# Patient Record
Sex: MALE | Race: WHITE | Employment: UNEMPLOYED | URBAN - METROPOLITAN AREA
[De-identification: names, ages, dates, MRNs, and addresses within clinical notes are randomized per-mention and may not be internally consistent; named-entity substitution may affect disease eponyms.]

---

## 2020-12-09 ENCOUNTER — HOSPITAL ENCOUNTER (INPATIENT)
Age: 25
LOS: 2 days | Discharge: HOME | DRG: 204 | End: 2020-12-11
Attending: EMERGENCY MEDICINE | Admitting: INTERNAL MEDICINE
Payer: COMMERCIAL

## 2020-12-09 ENCOUNTER — APPOINTMENT (OUTPATIENT)
Dept: GENERAL RADIOLOGY | Age: 25
DRG: 204 | End: 2020-12-09
Payer: COMMERCIAL

## 2020-12-09 PROBLEM — R55 POSTURAL SYNCOPE: Status: ACTIVE | Noted: 2020-12-09

## 2020-12-09 LAB
AMPHETAMINE SCREEN, URINE: ABNORMAL
ANION GAP SERPL CALCULATED.3IONS-SCNC: 10 MMOL/L (ref 3–16)
BARBITURATE SCREEN URINE: ABNORMAL
BASOPHILS ABSOLUTE: 0.1 K/UL (ref 0–0.2)
BASOPHILS RELATIVE PERCENT: 0.8 %
BENZODIAZEPINE SCREEN, URINE: ABNORMAL
BILIRUBIN URINE: NEGATIVE
BLOOD, URINE: NEGATIVE
BUN BLDV-MCNC: 14 MG/DL (ref 7–20)
CALCIUM SERPL-MCNC: 9.8 MG/DL (ref 8.3–10.6)
CANNABINOID SCREEN URINE: POSITIVE
CHLORIDE BLD-SCNC: 100 MMOL/L (ref 99–110)
CLARITY: CLEAR
CO2: 30 MMOL/L (ref 21–32)
COCAINE METABOLITE SCREEN URINE: ABNORMAL
COLOR: YELLOW
CREAT SERPL-MCNC: 0.8 MG/DL (ref 0.9–1.3)
EOSINOPHILS ABSOLUTE: 0.2 K/UL (ref 0–0.6)
EOSINOPHILS RELATIVE PERCENT: 2 %
GFR AFRICAN AMERICAN: >60
GFR NON-AFRICAN AMERICAN: >60
GLUCOSE BLD-MCNC: 95 MG/DL (ref 70–99)
GLUCOSE URINE: NEGATIVE MG/DL
HCT VFR BLD CALC: 45.9 % (ref 40.5–52.5)
HEMOGLOBIN: 15.3 G/DL (ref 13.5–17.5)
KETONES, URINE: ABNORMAL MG/DL
LEUKOCYTE ESTERASE, URINE: NEGATIVE
LYMPHOCYTES ABSOLUTE: 1.2 K/UL (ref 1–5.1)
LYMPHOCYTES RELATIVE PERCENT: 15.8 %
Lab: ABNORMAL
MCH RBC QN AUTO: 30.3 PG (ref 26–34)
MCHC RBC AUTO-ENTMCNC: 33.4 G/DL (ref 31–36)
MCV RBC AUTO: 90.7 FL (ref 80–100)
METHADONE SCREEN, URINE: ABNORMAL
MICROSCOPIC EXAMINATION: ABNORMAL
MONOCYTES ABSOLUTE: 0.5 K/UL (ref 0–1.3)
MONOCYTES RELATIVE PERCENT: 6 %
NEUTROPHILS ABSOLUTE: 5.9 K/UL (ref 1.7–7.7)
NEUTROPHILS RELATIVE PERCENT: 75.4 %
NITRITE, URINE: NEGATIVE
OPIATE SCREEN URINE: ABNORMAL
OXYCODONE URINE: ABNORMAL
PDW BLD-RTO: 12.4 % (ref 12.4–15.4)
PH UA: 6.5
PH UA: 6.5 (ref 5–8)
PHENCYCLIDINE SCREEN URINE: ABNORMAL
PLATELET # BLD: 223 K/UL (ref 135–450)
PMV BLD AUTO: 7.6 FL (ref 5–10.5)
POTASSIUM REFLEX MAGNESIUM: 3.6 MMOL/L (ref 3.5–5.1)
PROPOXYPHENE SCREEN: ABNORMAL
PROTEIN UA: NEGATIVE MG/DL
RBC # BLD: 5.06 M/UL (ref 4.2–5.9)
SODIUM BLD-SCNC: 140 MMOL/L (ref 136–145)
SPECIFIC GRAVITY UA: 1.02 (ref 1–1.03)
URINE TYPE: ABNORMAL
UROBILINOGEN, URINE: 1 E.U./DL
WBC # BLD: 7.9 K/UL (ref 4–11)

## 2020-12-09 PROCEDURE — 85025 COMPLETE CBC W/AUTO DIFF WBC: CPT

## 2020-12-09 PROCEDURE — 96361 HYDRATE IV INFUSION ADD-ON: CPT

## 2020-12-09 PROCEDURE — 1200000000 HC SEMI PRIVATE

## 2020-12-09 PROCEDURE — 81003 URINALYSIS AUTO W/O SCOPE: CPT

## 2020-12-09 PROCEDURE — 71046 X-RAY EXAM CHEST 2 VIEWS: CPT

## 2020-12-09 PROCEDURE — 73060 X-RAY EXAM OF HUMERUS: CPT

## 2020-12-09 PROCEDURE — 73030 X-RAY EXAM OF SHOULDER: CPT

## 2020-12-09 PROCEDURE — 36415 COLL VENOUS BLD VENIPUNCTURE: CPT

## 2020-12-09 PROCEDURE — 99283 EMERGENCY DEPT VISIT LOW MDM: CPT

## 2020-12-09 PROCEDURE — 80307 DRUG TEST PRSMV CHEM ANLYZR: CPT

## 2020-12-09 PROCEDURE — 2580000003 HC RX 258: Performed by: STUDENT IN AN ORGANIZED HEALTH CARE EDUCATION/TRAINING PROGRAM

## 2020-12-09 PROCEDURE — 96360 HYDRATION IV INFUSION INIT: CPT

## 2020-12-09 PROCEDURE — 80048 BASIC METABOLIC PNL TOTAL CA: CPT

## 2020-12-09 RX ORDER — BUSPIRONE HYDROCHLORIDE 10 MG/1
10 TABLET ORAL 2 TIMES DAILY
COMMUNITY

## 2020-12-09 RX ORDER — ACETAMINOPHEN 325 MG/1
650 TABLET ORAL EVERY 6 HOURS PRN
Status: DISCONTINUED | OUTPATIENT
Start: 2020-12-09 | End: 2020-12-11 | Stop reason: HOSPADM

## 2020-12-09 RX ORDER — POLYETHYLENE GLYCOL 3350 17 G/17G
17 POWDER, FOR SOLUTION ORAL DAILY PRN
Status: DISCONTINUED | OUTPATIENT
Start: 2020-12-09 | End: 2020-12-11 | Stop reason: HOSPADM

## 2020-12-09 RX ORDER — SODIUM CHLORIDE 0.9 % (FLUSH) 0.9 %
10 SYRINGE (ML) INJECTION PRN
Status: DISCONTINUED | OUTPATIENT
Start: 2020-12-09 | End: 2020-12-11 | Stop reason: HOSPADM

## 2020-12-09 RX ORDER — SODIUM CHLORIDE, SODIUM LACTATE, POTASSIUM CHLORIDE, AND CALCIUM CHLORIDE .6; .31; .03; .02 G/100ML; G/100ML; G/100ML; G/100ML
1000 INJECTION, SOLUTION INTRAVENOUS ONCE
Status: COMPLETED | OUTPATIENT
Start: 2020-12-09 | End: 2020-12-09

## 2020-12-09 RX ORDER — BUSPIRONE HYDROCHLORIDE 10 MG/1
10 TABLET ORAL 2 TIMES DAILY
Status: DISCONTINUED | OUTPATIENT
Start: 2020-12-10 | End: 2020-12-11 | Stop reason: HOSPADM

## 2020-12-09 RX ORDER — TRAMADOL HYDROCHLORIDE 50 MG/1
50 TABLET ORAL EVERY 6 HOURS PRN
Status: DISCONTINUED | OUTPATIENT
Start: 2020-12-09 | End: 2020-12-11 | Stop reason: HOSPADM

## 2020-12-09 RX ORDER — SODIUM CHLORIDE 0.9 % (FLUSH) 0.9 %
10 SYRINGE (ML) INJECTION EVERY 12 HOURS SCHEDULED
Status: DISCONTINUED | OUTPATIENT
Start: 2020-12-10 | End: 2020-12-11 | Stop reason: HOSPADM

## 2020-12-09 RX ORDER — ONDANSETRON 2 MG/ML
4 INJECTION INTRAMUSCULAR; INTRAVENOUS EVERY 6 HOURS PRN
Status: DISCONTINUED | OUTPATIENT
Start: 2020-12-09 | End: 2020-12-11 | Stop reason: HOSPADM

## 2020-12-09 RX ORDER — PROMETHAZINE HYDROCHLORIDE 12.5 MG/1
12.5 TABLET ORAL EVERY 6 HOURS PRN
Status: DISCONTINUED | OUTPATIENT
Start: 2020-12-09 | End: 2020-12-11 | Stop reason: HOSPADM

## 2020-12-09 RX ORDER — LORATADINE 10 MG/1
10 TABLET ORAL DAILY
COMMUNITY

## 2020-12-09 RX ORDER — ACETAMINOPHEN 650 MG/1
650 SUPPOSITORY RECTAL EVERY 6 HOURS PRN
Status: DISCONTINUED | OUTPATIENT
Start: 2020-12-09 | End: 2020-12-11 | Stop reason: HOSPADM

## 2020-12-09 RX ADMIN — SODIUM CHLORIDE, POTASSIUM CHLORIDE, SODIUM LACTATE AND CALCIUM CHLORIDE 1000 ML: 600; 310; 30; 20 INJECTION, SOLUTION INTRAVENOUS at 20:16

## 2020-12-09 ASSESSMENT — ENCOUNTER SYMPTOMS
COUGH: 0
DIARRHEA: 0
NAUSEA: 0
VOMITING: 0
ABDOMINAL PAIN: 0
SHORTNESS OF BREATH: 0
VOICE CHANGE: 0
ROS SKIN COMMENTS: POSITIVE FOR BRUISING

## 2020-12-10 LAB
ANION GAP SERPL CALCULATED.3IONS-SCNC: 9 MMOL/L (ref 3–16)
BASOPHILS ABSOLUTE: 0.1 K/UL (ref 0–0.2)
BASOPHILS RELATIVE PERCENT: 1 %
BUN BLDV-MCNC: 12 MG/DL (ref 7–20)
CALCIUM SERPL-MCNC: 9.2 MG/DL (ref 8.3–10.6)
CHLORIDE BLD-SCNC: 104 MMOL/L (ref 99–110)
CO2: 27 MMOL/L (ref 21–32)
CREAT SERPL-MCNC: 0.8 MG/DL (ref 0.9–1.3)
EOSINOPHILS ABSOLUTE: 0.3 K/UL (ref 0–0.6)
EOSINOPHILS RELATIVE PERCENT: 4.2 %
GFR AFRICAN AMERICAN: >60
GFR NON-AFRICAN AMERICAN: >60
GLUCOSE BLD-MCNC: 99 MG/DL (ref 70–99)
HCT VFR BLD CALC: 41.2 % (ref 40.5–52.5)
HEMOGLOBIN: 13.9 G/DL (ref 13.5–17.5)
LV EF: 58 %
LVEF MODALITY: NORMAL
LYMPHOCYTES ABSOLUTE: 2.1 K/UL (ref 1–5.1)
LYMPHOCYTES RELATIVE PERCENT: 34.7 %
MCH RBC QN AUTO: 30.2 PG (ref 26–34)
MCHC RBC AUTO-ENTMCNC: 33.7 G/DL (ref 31–36)
MCV RBC AUTO: 89.6 FL (ref 80–100)
MONOCYTES ABSOLUTE: 0.5 K/UL (ref 0–1.3)
MONOCYTES RELATIVE PERCENT: 7.4 %
NEUTROPHILS ABSOLUTE: 3.2 K/UL (ref 1.7–7.7)
NEUTROPHILS RELATIVE PERCENT: 52.7 %
PDW BLD-RTO: 12.1 % (ref 12.4–15.4)
PLATELET # BLD: 189 K/UL (ref 135–450)
PMV BLD AUTO: 7.6 FL (ref 5–10.5)
POTASSIUM REFLEX MAGNESIUM: 3.9 MMOL/L (ref 3.5–5.1)
RBC # BLD: 4.6 M/UL (ref 4.2–5.9)
SARS-COV-2, NAAT: NOT DETECTED
SODIUM BLD-SCNC: 140 MMOL/L (ref 136–145)
TOTAL CK: 2167 U/L (ref 39–308)
WBC # BLD: 6.2 K/UL (ref 4–11)

## 2020-12-10 PROCEDURE — 82550 ASSAY OF CK (CPK): CPT

## 2020-12-10 PROCEDURE — 85025 COMPLETE CBC W/AUTO DIFF WBC: CPT

## 2020-12-10 PROCEDURE — 6370000000 HC RX 637 (ALT 250 FOR IP)

## 2020-12-10 PROCEDURE — 95819 EEG AWAKE AND ASLEEP: CPT

## 2020-12-10 PROCEDURE — 1200000000 HC SEMI PRIVATE

## 2020-12-10 PROCEDURE — 80048 BASIC METABOLIC PNL TOTAL CA: CPT

## 2020-12-10 PROCEDURE — 2580000003 HC RX 258

## 2020-12-10 PROCEDURE — 93306 TTE W/DOPPLER COMPLETE: CPT

## 2020-12-10 PROCEDURE — G0378 HOSPITAL OBSERVATION PER HR: HCPCS

## 2020-12-10 PROCEDURE — 96372 THER/PROPH/DIAG INJ SC/IM: CPT

## 2020-12-10 PROCEDURE — U0002 COVID-19 LAB TEST NON-CDC: HCPCS

## 2020-12-10 PROCEDURE — 36415 COLL VENOUS BLD VENIPUNCTURE: CPT

## 2020-12-10 PROCEDURE — 6360000002 HC RX W HCPCS

## 2020-12-10 RX ADMIN — ENOXAPARIN SODIUM 40 MG: 40 INJECTION SUBCUTANEOUS at 08:27

## 2020-12-10 RX ADMIN — Medication 10 ML: at 08:28

## 2020-12-10 RX ADMIN — TRAMADOL HYDROCHLORIDE 50 MG: 50 TABLET, FILM COATED ORAL at 15:17

## 2020-12-10 RX ADMIN — BUSPIRONE HYDROCHLORIDE 10 MG: 10 TABLET ORAL at 20:46

## 2020-12-10 RX ADMIN — BUSPIRONE HYDROCHLORIDE 10 MG: 10 TABLET ORAL at 08:27

## 2020-12-10 RX ADMIN — SERTRALINE HYDROCHLORIDE 50 MG: 50 TABLET ORAL at 08:27

## 2020-12-10 RX ADMIN — TRAMADOL HYDROCHLORIDE 50 MG: 50 TABLET, FILM COATED ORAL at 00:08

## 2020-12-10 RX ADMIN — TRAMADOL HYDROCHLORIDE 50 MG: 50 TABLET, FILM COATED ORAL at 06:32

## 2020-12-10 ASSESSMENT — PAIN DESCRIPTION - LOCATION
LOCATION: ARM

## 2020-12-10 ASSESSMENT — PAIN DESCRIPTION - ONSET
ONSET: ON-GOING

## 2020-12-10 ASSESSMENT — PAIN DESCRIPTION - DESCRIPTORS
DESCRIPTORS: ACHING

## 2020-12-10 ASSESSMENT — PAIN DESCRIPTION - FREQUENCY
FREQUENCY: CONTINUOUS

## 2020-12-10 ASSESSMENT — PAIN DESCRIPTION - ORIENTATION
ORIENTATION: RIGHT

## 2020-12-10 ASSESSMENT — PAIN DESCRIPTION - PAIN TYPE
TYPE: ACUTE PAIN

## 2020-12-10 ASSESSMENT — PAIN DESCRIPTION - PROGRESSION
CLINICAL_PROGRESSION: GRADUALLY WORSENING
CLINICAL_PROGRESSION: NOT CHANGED
CLINICAL_PROGRESSION: NOT CHANGED

## 2020-12-10 ASSESSMENT — ENCOUNTER SYMPTOMS
SORE THROAT: 0
CHEST TIGHTNESS: 0
SHORTNESS OF BREATH: 0
DIARRHEA: 0
NAUSEA: 0
COUGH: 0
VOMITING: 0
ABDOMINAL PAIN: 0
APNEA: 0
CONSTIPATION: 0

## 2020-12-10 ASSESSMENT — PAIN SCALES - GENERAL
PAINLEVEL_OUTOF10: 6
PAINLEVEL_OUTOF10: 0
PAINLEVEL_OUTOF10: 6
PAINLEVEL_OUTOF10: 0
PAINLEVEL_OUTOF10: 0
PAINLEVEL_OUTOF10: 7

## 2020-12-10 NOTE — PROGRESS NOTES
4 Eyes Admission Assessment     I agree as the admission nurse that 2 RN's have performed a thorough Head to Toe Skin Assessment on the patient. ALL assessment sites listed below have been assessed on admission. Areas assessed by both nurses:   [x]   Head, Face, and Ears   [x]   Shoulders, Back, and Chest  [x]   Arms, Elbows, and Hands   [x]   Coccyx, Sacrum, and Ischium  [x]   Legs, Feet, and Heels        Does the Patient have Skin Breakdown?   No         Dennis Prevention initiated:  No   Wound Care Orders initiated:  No      [facility] nurse consulted for Pressure Injury (Stage 3,4, Unstageable, DTI, NWPT, and Complex wounds) or Dennis score 18 or lower:  No      Nurse 1 eSignature: Electronically signed by Sheron Calderon RN on 12/10/20 at 2:32 AM EST    **SHARE this note so that the co-signing nurse is able to place an eSignature**    Nurse 2 eSignature: Electronically signed by Yovana Chambers RN on 12/10/20 at 3:31 AM EST

## 2020-12-10 NOTE — PROGRESS NOTES
List of Home Medications the patient is currently taking is complete. Home Medication list in EPIC updated to reflect changes noted below. Source of medications in list is the patient. The following medications were added to admission medication list:  Buspirone 10mg BID  Sertraline 50mg QD  Loratadine 10mg QD    Please note: The patient took all of his medications this morning. He does not know the strengths of his medications, but they were added based upon refill history. He said that he would update the nurse on the strength when his family members contact him. Please call with questions.   [phone] [id number]  12/9/2020 9:52 PM

## 2020-12-10 NOTE — ED NOTES
PT presents to the ED from home ambulatory with c/o right upper pain. Pt reports he had a syncopal episode on this past Sunday and has had right arm pain and bruising since. Pt a/o x4, answering questions appropriately.       Trina Ward RN  12/09/20 [id number]

## 2020-12-10 NOTE — PROCEDURES
Name: Gloria Watkins   : 1995   Interpreting Physician: Jose Guadalupe Porras MD   Referring Physician: TATI Goldberg  Date of EE/10/2020      Clinical History:LOC, CANNABINOID ABUSE    Current Antiepileptic Medications: Current Facility-Administered Medications: perflutren lipid microspheres (DEFINITY) injection 1.65 mg, 1.5 mL, Intravenous, ONCE PRN  busPIRone (BUSPAR) tablet 10 mg, 10 mg, Oral, BID  sertraline (ZOLOFT) tablet 50 mg, 50 mg, Oral, Daily  sodium chloride flush 0.9 % injection 10 mL, 10 mL, Intravenous, 2 times per day  sodium chloride flush 0.9 % injection 10 mL, 10 mL, Intravenous, PRN  acetaminophen (TYLENOL) tablet 650 mg, 650 mg, Oral, Q6H PRN **OR** acetaminophen (TYLENOL) suppository 650 mg, 650 mg, Rectal, Q6H PRN  polyethylene glycol (GLYCOLAX) packet 17 g, 17 g, Oral, Daily PRN  promethazine (PHENERGAN) tablet 12.5 mg, 12.5 mg, Oral, Q6H PRN **OR** ondansetron (ZOFRAN) injection 4 mg, 4 mg, Intravenous, Q6H PRN  enoxaparin (LOVENOX) injection 40 mg, 40 mg, Subcutaneous, Daily  traMADol (ULTRAM) tablet 50 mg, 50 mg, Oral, Q6H PRN         Technical Summary:  The EEG was recorded in a digital format on a patient who is reported to be awake and drowsy state during the recording. The patient was not sleep deprived prior to the EEG. The recording revealed  background rhythm approximately 7-8hz range  maximal over the posterior head regions and reactive to eye opening and closure. The record was remarkable for the presence of mild generalized slowing in the form of polymorphic theta activity. The record was remarkable for the absence of epileptiform discharges. Photic stimulation was performed at various flash frequencies and without photoparoxysmal response    Hyperventilation was not performed due to medical condition. During the recording stage II sleep  was not seen. The EKG lead revealed no rhythm abnormalties.     EEG Interpretation:   The EEG was abnormal

## 2020-12-10 NOTE — CARE COORDINATION
Case Management Assessment           Initial Evaluation                Date / Time of Evaluation: 12/10/2020 3:29 PM                 Assessment Completed by: Cathie Lancaster    Patient Name: Leah Bar     YOB: 1995  Diagnosis: Postural syncope [R55]     Date / Time: 12/9/2020  7:20 PM    Patient Admission Status: Inpatient    If patient is discharged prior to next notation, then this note serves as note for discharge by case management. Current PCP: No primary care provider on file. Clinic Patient: No    Chart Reviewed: Yes  Patient/ Family Interviewed: Yes    Initial assessment completed at bedside with: patient    Hospitalization in the last 30 days: No    Emergency Contacts:  Extended Emergency Contact Information  Primary Emergency Contact: erasmo sherwood Phone: [phone]  Relation: Parent    Advance Directives:   Code Status: Full 2021 Shoshana Astudillo Giovanniy: No  Agent:   Contact Number:     Copy present: No     In paper Chart: No    Scanned into EMR No    Financial  [facility]  [address] / Plan: [facility]  [address] / Product Type: *No Product type* /     Pre-cert required for SNF: No    Pharmacy  No Pharmacies Listed    Potential assistance Purchasing Medications: Potential Assistance Purchasing Medications: No  Does Patient want to participate in local refill/ meds to beds program?:      Meds To Beds General Rules:  1. Can ONLY be done Monday- Friday between 8:30am-5pm  2. Prescription(s) must be in pharmacy by 3pm to be filled same day  3. Copy of patient's insurance/ prescription drug card and patient face sheet must be sent along with the prescription(s)  4. Cost of Rx cannot be added to hospital bill. If financial assistance is needed, please contact unit  or ;  or  CANNOT provide pharmacy voucher for patients co-pays  5.  Patients can then  the prescription on their way out of the hospital at discharge, or pharmacy can deliver to the bedside if staff is available. (payment due at time of pick-up or delivery - cash, check, or card accepted)     Able to afford home medications/ co-pay costs: Yes    ADLS  Support Systems: Family Members    PT AM-PAC:   /24  OT AM-PAC:   /24    [place]: home w/family  Steps:     Plans to RETURN to current housing: Yes  Barriers to RETURNING to current housing: safety, pain    Yobani Burrell 77  Currently ACTIVE with [facility] [address]: No  Home Care Agency: Not Applicable    Currently ACTIVE with [place] on Aging: No  Passport/ Waiver: No  Passport/ Waiver Services: Not Applicable    :  Phone:       [address] [employer] Provider:   Equipment:     Home Oxygen and [address] prior to admission: No  [address]: Not Applicable  Other Respiratory Equipment:     Informed of need to bring portable home O2 tank on day of DISCHARGE for nursing to connect prior to leaving: No  Verbalized agreement/Understanding: No  Person to bring portable tank at discharge:     Dialysis  Active with HD/PD prior to admission: No  Nephrologist:     HD Center:  Not Applicable    DISCHARGE PLAN:  Disposition: Home- No Services Needed    Transportation PLAN for discharge: family     Factors facilitating achievement of predicted outcomes: Family support    Barriers to discharge: Pain    Additional Case Management Notes:   Patient here from home with family. Plans to discharge to home with no needs. Will follow up outpatient with Ortho and physical therapy in 2 weeks.       The Plan for Transition of Care is related to the following treatment goals of Postural syncope [R55]    The Patient and/or patient representative Natalie Fisher and his family were provided with a choice of provider and agrees with the discharge plan Yes    Freedom of choice list was provided with basic dialogue that supports the patient's individualized plan of care/goals and shares the quality data associated with the providers.  Yes    Care Transition patient: No    Brissa Bryant RN  [facility]  Case Management Department  Ph: [phone]   Fax: [phone]

## 2020-12-10 NOTE — CONSULTS
Neurology Consult Note  Reason for Consult: syncope, possible seizure    Chief complaint: \"I broke my right shoulder\"    Dr Samantha Ovalles MD asked me to see Kofi Payton in consultation for evaluation of syncope, possible seizure. History of Present Illness:  I obtained my information via the patient, supplemented with chart review. Kofi Payton is a 31 y.o. male with hx of anxiety, depression,  pectus excavatum who presented to the ED on 12/9/20 for evaluation of right shoulder pain s/p fall found to have fracture of proximal humerus. The patient had fallen at home 3 days prior to admission. He had reportedly been out drinking heavily the night prior to his fall. The following day he was feeling generally poor, not eating or drinking much. He had stood up from his chair which is the last thing he recalls. The next thing he remembers is waking up on the couch to his best guess 1.5 hrs later. He assumes he had fallen else where and was able to get his way to the couch as items were tipped over in his house and he had right shoulder pain and bruising but he does not have recollection of the event. He does also endorse biting the right side of his tongue, and general arthralgias specifically right shoulder. Otherwise denies any urinary or bowel incontinence, headache, recent illness, fever, chills, shortness of breath or chest pain. He has had events \" of head rushing\" described as presyncope with brief loss of vision and difficulty walking with position changes in the past but has not had an event like that for the last 8-9 years. He denies any recent medications changes. He has daily EtOH consumption and reports 1-2 standard drinks of bourbon or craft beer with increased consumption on the weekends. Denies ever withdrawing from EtOH and reports his last drink was Saturday 12/5/20 He does use marijuana. Denies any tobacco use.      Today the patient reports that other than his general °C) 98.1 °F (36.7 °C) 97.8 °F (36.6 °C) 98.2 °F (36.8 °C)   TempSrc: Oral Oral Oral Oral   SpO2: 100% 96% 96% 97%   Weight: 185 lb (83.9 kg)      Height: 5' 6\" (1.676 m)         Constitutional    Vital signs: BP, HR, and RR reviewed   General Alert, no distress, well-nourished  Eyes: unable to visualize the fundi  Cardiovascular: pulses symmetric in all 4 extremities. No peripheral edema. Psychiatric: cooperative with examination, no  psychotic behavior noted. Neurologic  Mental status:   orientation to person, place, time and situation   General fund of knowledge:  grossly intact   Memory: Short term and long term intact   Attention intact as able to attend well to the exam     Language fluent in conversation   Comprehension intact; follows simple commands  Cranial nerves:   CN2: Visual Fields full w/o extinction on confrontational testing,   CN 3,4,6: extraocular muscles intact, PERRLA @ 4mm. CN5: V1: V2: V3: intact to light touch sensation bilaterally  CN7: upper and lower facial symmetric without dysarthria  CN8: hearing grossly intact to conversation. CN9/10: palate elevated symmetrically  CN11: right: deferred for comfort r/t right humerus fracture. Left: full strength , SCM without weakness. CN12: tongue midline with protrusion. Able to move tongue side to side. Strength: Grasp: BUE 5/5 . RUE: Limited assessment r/t right humerus fracture. 3/5  elbow flexion, elbow extension. LUE: 5/5  Shoulder abduction, elbow flexion, elbow extension. Dorsiflexion: R 5/5, L 5/5 ;  Plantar flexion: R 5/5, L 5/5  RLE: 5/5 Hip flexion, Knee flexion, Knee extension. LLE: 5/5 Hip flexion, Knee flexion, Knee extension. Deep tendon reflexes:   R Bicep: deferred for comfort s/p fracture   R Brachioradialis: deferred for comfort s/p fracture  R Patellar: 3+   R Babinski: Toes no response  L Bicep 2+ L Brachioradialis[de-identified] 2+  L Patellar: 3+    L Babinski: Toes no response.    Sensory: light touch intact in all 4 extremities. No sensory extinction on double simultaneous stimulation  Cerebellar/coordination: finger nose finger normal without ataxia in LUE. RUE deferred for comfort. Tone: normal in all LUE and BLE extremities. RUE deferred for comfort. Gait: deferred for safety. Labs  Na: 140  K: 3.9  BUN: 12  Creatinine: 0.8  Glucose: 99  Calcium: 9.2    WBC: 6.2  RBC: 4.60  Hgb: 13.9  Hct: 41.2  Platelet: 065    Urine Drug Screen: Positive for cannabinoid  UA: negative for nitrites and leukocyte esterase. Studies  No pertinent neuro imaging on file for admission. Impression  1. Loss of consciousness with prolonged amnesia and tongue trauma. 2. EtOH abuse. 3. Right Humerus fracture. 4. Drug abuse     Karla Melchor is a 31 y.o. male who presents for evaluation of right shoulder pain s/p suspect fall with loss of consciousness with prolonged amnesia and tongue trauma, 3 days ago. Found to have proximal right humerus fracture. Patient reported heavy EtOH consumption night prior to event. He does have tongue trauma. He does have hx of prior presyncope event. Etiology: cannot exclude seizure given prolonged amnesia to event, and tongue trauma. EtOH can lower seizure thresh hold. Cannot exclude other causes of syncope. Recommendations  - Routine EEG (ordered) No AED at this time.   - MRI Brain w/ w/o (ordere)   - Orthostatic Vitals. - Infectious/Metabolic work up per primary team.   - CIWA monitoring and management primary team.   - Seizure precautions. - Given the patient had an altered level of awareness, the patient should not drive for at least 3 months, episode free with clearance from a physician, as well as refraining from activities that could be of danger to himself or others should her have another seizure such as swimming, bathing, operating heavy machinery, climbing heights or watching young children who do not have the ability to call for help if needed.   He verbalized understanding.        Kana Mcdonald, [address] Neurology    A copy of this note was provided for Dr Aubrey Hansen MD

## 2020-12-10 NOTE — PROGRESS NOTES
Progress Note    Admit Date: 12/9/2020  Diet: DIET GENERAL;    CC: fall & right shoulder pain    Interval history: Patient seen at bedside this morning. He reports feeling well with some soarness of right shoulder. Patient reports that he does not remember the fall and only recalls falling off a couch. Patient reports pain is controlled well. Patient denies CP, fever, dysuria , headache , fevers. No acute events overnight. HPI: Mr. Jessy Zavala is a 24-year-old male with past medical history of anxiety, pectus excavatum who presents today for worsening right shoulder pain. Patient states that 3 days prior to presentation he had a fall at home. He states that he stood up from a chair, became dizzy, lost consciousness and fell. He does not know how long he was unconscious for. Patient woke up while laying on the couch. Does not recall how he would have gotten from the floor to the couch. Of note, patient went out with friends the evening before. He drank more than usual.  The morning in question the patient did not have breakfast and only had some coffee and a little bit of water. If any question happened around 11 or 12 AM.  The patient does endorse occasional marijuana use, but denies other drugs. He has not started or stopped any new medications. Patient does not have a history of seizure, and did not have bowel or urinary incontinence. States he might have passed out but is unsure. Patient does endorse having a unknown heart murmur as his child, but has not had any issues in adult life. Patient does endorse a similar episode greater than 5 years ago where he had presyncope, but did not fall he did not present for medical treatment at this time. He also has a history of pectus excavatum over the past several days, the bruising became worse over his right upper arm and the pain continued. This prompted him to present to the ED today to have his arm evaluated.     In the ED.   Vitals were stable

## 2020-12-10 NOTE — PLAN OF CARE
Problem: Falls - Risk of:  Goal: Will remain free from falls  Description: Will remain free from falls  Outcome: Ongoing   Pt has been free from falls this shift, bed alarm on, bed in lowest position, 2/4 side rails up, nonskid socks on, wheels locked, bedside table and call light in reach. Encouraged pt to call out if needed anything. Problem: Pain:  Goal: Pain level will decrease  Description: Pain level will decrease  Outcome: Ongoing   Pt c/o moderate aching pain in right arm, medicated per mar with prn tramadol. Pt verbalized relief and is now resting in bed. Will continue to assess pain level.

## 2020-12-10 NOTE — CONSULTS
Films reviewed and patient discussed with ER physican last night. They were going to send him home, but then admitted him due to suspected syncope. Patient with non-displaced right proximal humerus fracture. Neurovascular status intact per ER attending. A/P:  Fracture can likely be managed non-operatively. Recommend sling and swathe. We will start some physical therapy in about 2 weeks for ROM. COVID test pending. Follw with you here in hospital.    If he is going to be discharged, he should follow up with me next week in my office for repeat films. Eling and swathe should not be removed except to adust for comfort.

## 2020-12-10 NOTE — PROGRESS NOTES
Pt alert and oriented x4, VSS, IV ns locked in left arm. Pt denies headache and numbness/tingling. Right arm in sling, pt had moderate hand . Fingers are warm to the touch. Bed alarm on, bedside table and call light in reach.

## 2020-12-10 NOTE — ED PROVIDER NOTES
[facility] RESIDENT NOTE       Date of evaluation: 12/9/2020    Chief Complaint     Loss of Consciousness (syncope on 12/6) and Arm Pain (right arm pain and bruising from syncope)      of Present Illness     Darnell Chilel is a 31 y.o. male who presents with loss of consciousness and right shoulder pain. 3 days ago, the patient was at home when he stood up from a chair and lost consciousness. He does not know how long he was unconscious, but he woke up several hours later lying on a couch. He does not recall how he got to the couch. He has no known history of seizures. He does report that he may have bitten his tongue during this episode. He began having pain in his right arm once he woke up. Over the past several days, he has developed bruising over his right upper arm. He has no numbness. He does have pain with any range of motion of the right arm, with the pain located in his right shoulder. He has no pain in his right elbow or right wrist.  He has no chest pain or difficulty breathing. He does have a history of pectus excavatum with no other known associated diagnoses. The day before his loss of consciousness, he was drinking alcohol, but not on the day of the event. He presented to urgent care for the symptoms and was advised to present to the emergency department for further evaluation. Review of Systems     Review of Systems   Constitutional: Negative for chills and fever. HENT: Negative for voice change. Eyes: Negative for visual disturbance. Respiratory: Negative for cough and shortness of breath. Cardiovascular: Negative for chest pain. Gastrointestinal: Negative for abdominal pain, diarrhea, nausea and vomiting. Musculoskeletal: Negative for neck pain and neck stiffness. Skin:        Positive for bruising   Neurological: Positive for syncope. Negative for numbness.    All other systems reviewed and are negative. Pertinent positive and negative findings as documented in the HPI. Otherwise all other systems were reviewed and were negative. Past Medical, Surgical, Family, and Social History     He has no past medical history on file. He has no past surgical history on file. His family history is not on file. He     Medications     Previous Medications    No medications on file       Allergies     He has no allergies on file. Physical Exam     INITIAL VITALS: BP: 130/89, Temp: 98.9 °F (37.2 °C), Pulse: 117, Resp: 13, SpO2: 100 %   Physical Exam  Vitals signs and nursing note reviewed. Constitutional:       General: He is not in acute distress. HENT:      Head: Normocephalic and atraumatic. Eyes:      General: No scleral icterus. Extraocular Movements: Extraocular movements intact. Pupils: Pupils are equal, round, and reactive to light. Neck:      Musculoskeletal: Normal range of motion and neck supple. Cardiovascular:      Rate and Rhythm: Regular rhythm. Tachycardia present. Heart sounds: No murmur. No friction rub. No gallop. Pulmonary:      Effort: No respiratory distress. Breath sounds: Normal breath sounds. No wheezing, rhonchi or rales. Abdominal:      General: Abdomen is flat. Palpations: Abdomen is soft. Tenderness: There is no abdominal tenderness. There is no guarding or rebound. Musculoskeletal:      Comments: Mild tenderness to the right posterior shoulder. Mild swelling to the right shoulder and right upper arm with ecchymosis overlying the right upper arm anteriorly. Right radial pulse intact. No pain with range of motion of the right elbow or wrist.  Mild pain with passive range of motion of the right elbow. His active range of motion is limited significantly by pain. No tenderness to the long bones of the arms. Skin:     General: Skin is warm. Capillary Refill: Capillary refill takes less than 2 seconds.    Neurological: General: No focal deficit present. Mental Status: He is alert and oriented to person, place, and time. Cranial Nerves: No cranial nerve deficit. Psychiatric:         Mood and Affect: Mood normal.         Behavior: Behavior normal.         DiagnosticResults     EKG   Interpreted in conjunction with emergencydepartment physician Denae Holden MD  Rhythm: sinus tachycardia  Rate: 100-110  Axis: normal  Ectopy: none  Conduction: normal  ST Segments: normal  T Waves:non specific changes  Q Waves: v1 and aVr  Clinical Impression: sinus tachycardia  Comparison: No previous EKG available for comparison    RADIOLOGY:  XR SHOULDER RIGHT (MIN 2 VIEWS)   Final Result      Nondisplaced, comminuted fracture of the right proximal humerus. XR HUMERUS RIGHT (MIN 2 VIEWS)   Final Result      Comminuted, minimally displaced fracture of the proximal humerus. The mid to distal humerus is intact. XR CHEST (2 VW)   Final Result      No acute cardiopulmonary findings.              LABS:   Results for orders placed or performed during the hospital encounter of 12/09/20   CBC Auto Differential   Result Value Ref Range    WBC 7.9 4.0 - 11.0 K/uL    RBC 5.06 4.20 - 5.90 M/uL    Hemoglobin 15.3 13.5 - 17.5 g/dL    Hematocrit 45.9 40.5 - 52.5 %    MCV 90.7 80.0 - 100.0 fL    MCH 30.3 26.0 - 34.0 pg    MCHC 33.4 31.0 - 36.0 g/dL    RDW 12.4 12.4 - 15.4 %    Platelets 819 088 - 351 K/uL    MPV 7.6 5.0 - 10.5 fL    Neutrophils % 75.4 %    Lymphocytes % 15.8 %    Monocytes % 6.0 %    Eosinophils % 2.0 %    Basophils % 0.8 %    Neutrophils Absolute 5.9 1.7 - 7.7 K/uL    Lymphocytes Absolute 1.2 1.0 - 5.1 K/uL    Monocytes Absolute 0.5 0.0 - 1.3 K/uL    Eosinophils Absolute 0.2 0.0 - 0.6 K/uL    Basophils Absolute 0.1 0.0 - 0.2 K/uL   Basic Metabolic Panel w/ Reflex to MG   Result Value Ref Range    Sodium 140 136 - 145 mmol/L    Potassium reflex Magnesium 3.6 3.5 - 5.1 mmol/L    Chloride 100 99 - 110 mmol/L    CO2 30 21 - 32 mmol/L    Anion Gap 10 3 - 16    Glucose 95 70 - 99 mg/dL    BUN 14 7 - 20 mg/dL    CREATININE 0.8 (L) 0.9 - 1.3 mg/dL    GFR Non-African American >60 >60    GFR African American >60 >60    Calcium 9.8 8.3 - 10.6 mg/dL       ED BEDSIDE ULTRASOUND:  None    RECENT VITALS:  BP: 130/89, Temp: 98.9 °F (37.2 °C), Pulse: 117,Resp: 13, SpO2: 100 %     Procedures     None    ED Course     Nursing Notes, Past Medical Hx, Past Surgical Hx, Social Hx, Allergies, and Family Hx were reviewed. The patient was given the followingmedications:  Orders Placed This Encounter   Medications    lactated ringers bolus       CONSULTS:  [address] [place] [address] / MIGUEL A / Shant Grade is a 31 y.o. male presenting with right arm pain after an episode of loss of consciousness 3 days ago. On exam, his right arm is neurovascularly intact and does not appear dislocated. X-ray was obtained of the chest and right shoulder which demonstrates a nondisplaced right proximal humerus fracture. The patient's story does raise concern for possible seizure as a cause for his loss of consciousness and at least moderate energy injury to his right upper arm. I have a lower concern for a syncopal episode as the cause. Lab work today includes renal panel with no concerning electrolyte abnormalities. CBC reveals no leukocytosis or anemia. The patient will be placed in a sling for his proximal humerus fracture. Spoke with neurology who states that he would be unable to follow-up at [facility] where his insurance is accepted for several months. Therefore he recommended admission to the hospital for further evaluation for his possible first episode of seizure. I do not feel that cross-sectional CT imaging is necessary at this time in the emergency department as he will likely get MRI during his admission. The patient was agreeable with this plan.     This patient was also evaluated by the attending physician. All care plans werediscussed and agreed upon. Clinical Impression     1. Loss of consciousness ([facility] [address]    2. Other closed nondisplaced fracture of proximal end of right humerus, initial encounter        Disposition     PATIENT REFERRED TO:  No follow-up provider specified.     DISCHARGE MEDICATIONS:  New Prescriptions    No medications on file       Kareen Morales MD  Resident  12/09/20 4705

## 2020-12-10 NOTE — PLAN OF CARE
Problem: Pain:  Goal: Pain level will decrease  Description: Pain level will decrease  Outcome: Ongoing  Note: Patient complains of pain to right arm. PRN pain medication administered per request and doctors orders. Pt able to fall asleep. Will continue to monitor. Problem: Falls - Risk of:  Goal: Will remain free from falls  Description: Will remain free from falls  Outcome: Ongoing  Note: Patient medium fall risk and is up with assist x1 SBA. Patient alert and oriented x4, non skid socks on, bed in lowest position and locked, side rails up x2, call light and belongings within reach, bed alarm on for safety, fall sign posted. Will continue to monitor.

## 2020-12-10 NOTE — PROGRESS NOTES
PGY-1 Resident Progress Note  [facility]    Admit Date: 12/9/2020       CC: R. Shoulder pain    Overnight Events:  No acute events overnight. Patient was not seen,  gone for echo this AM      Family Hx: No family history on file. Scheduled Medications:    busPIRone  10 mg Oral BID    sertraline  50 mg Oral Daily    sodium chloride flush  10 mL Intravenous 2 times per day    enoxaparin  40 mg Subcutaneous Daily      PRN Medications: perflutren lipid microspheres, sodium chloride flush, acetaminophen **OR** acetaminophen, polyethylene glycol, promethazine **OR** ondansetron, traMADol  Diet: DIET GENERAL;    Continuous Infusions:    PHYSICAL EXAM:  /75   Pulse 67   Temp 98.2 °F (36.8 °C) (Oral)   Resp 16   Ht 5' 6\" (1.676 m)   Wt 185 lb (83.9 kg)   SpO2 97%   BMI 29.86 kg/m²   No results for input(s): POCGLU in the last 72 hours. Intake/Output Summary (Last 24 hours) at 12/10/2020 1004  Last data filed at 12/10/2020 0826  Gross per 24 hour   Intake 320 ml   Output 400 ml   Net -80 ml       Physical Exam      LABS:  Recent Labs     12/09/20  2002 12/10/20  0549   WBC 7.9 6.2   HGB 15.3 13.9   HCT 45.9 41.2    189                                                                    Recent Labs     12/09/20  2002 12/10/20  0549    140   K 3.6 3.9    104   CO2 30 27   BUN 14 12   CREATININE 0.8* 0.8*   GLUCOSE 95 99     No results for input(s): AST, ALT, ALB, BILITOT, ALKPHOS in the last 72 hours. No results for input(s): TROPONINI in the last 72 hours. No results for input(s): BNP in the last 72 hours. No results for input(s): CHOL, HDL in the last 72 hours. Invalid input(s): LDLCALCU  No results for input(s): INR in the last 72 hours. Imaging:          XR SHOULDER RIGHT (MIN 2 VIEWS)   Final Result      Nondisplaced, comminuted fracture of the right proximal humerus.       XR HUMERUS RIGHT (MIN 2 VIEWS)   Final Result      Comminuted, minimally displaced fracture of the proximal humerus. The mid to distal humerus is intact. XR CHEST (2 VW)   Final Result      No acute cardiopulmonary findings. MRI BRAIN W WO CONTRAST    (Results Pending)         Assessment & Plan:    Sylvester Powers is a 31 y.o. male that presents for R.  Shoulder pain    # Syncope r/o seizure  - No hx seizures  - Neuro consulted  - Continue telemetry  - f/u echocardiogram     # Fracture of Proximal Humerus  - Orthopedic surgery consulted  - Pain control Ultram 50 mg every 6 hrs    # Anxiety  - Continue home BuSpar and Zoloft      Code Status: Full Code  FEN: IVF: none; DIET GENERAL;  PPX:Lovenox   DISPO: pending neuro and ortho evaluation      This patient will be discussed with attending, Ger Perez MD.    Dari Yun MS4  12/10/2020, 10:04 AM

## 2020-12-10 NOTE — H&P
History & Physical  PGY 2  Internal Medicine    Chief Complaint: Syncopal episode, fall, and right shoulder pain    History Obtained From:  patient    HISTORY OF PRESENT ILLNESS:   Mr. Fausto Ambrosio is a 48-year-old male with past medical history of anxiety, pectus excavatum who presents today for worsening right shoulder pain. Patient states that 3 days prior to presentation he had a fall at home. He states that he stood up from a chair, became dizzy, lost consciousness and fell. He does not know how long he was unconscious for. Patient woke up while laying on the couch. Does not recall how he would have gotten from the floor to the couch. Of note, patient went out with friends the evening before. He drank more than usual.  The morning in question the patient did not have breakfast and only had some coffee and a little bit of water. If any question happened around 11 or 12 AM.  The patient does endorse occasional marijuana use, but denies other drugs. He has not started or stopped any new medications. Patient does not have a history of seizure, and did not have bowel or urinary incontinence. States he might have passed out but is unsure. Patient does endorse having a unknown heart murmur as his child, but has not had any issues in adult life. Patient does endorse a similar episode greater than 5 years ago where he had presyncope, but did not fall he did not present for medical treatment at this time. He also has a history of pectus excavatum over the past several days, the bruising became worse over his right upper arm and the pain continued. This prompted him to present to the ED today to have his arm evaluated. In the ED. Vitals were stable and labs were all within normal limits. X-rays were done of the patient's right shoulder. This showed a minimally displaced fracture of the proximal humerus. Neurology was consulted from the ED and will evaluate patient for syncope.        PAST HISTORY: Pharynx: Oropharynx is clear. Eyes:      General: No scleral icterus. Extraocular Movements: Extraocular movements intact. Conjunctiva/sclera: Conjunctivae normal.      Pupils: Pupils are equal, round, and reactive to light. Neck:      Musculoskeletal: Full passive range of motion without pain, normal range of motion and neck supple. Cardiovascular:      Rate and Rhythm: Normal rate and regular rhythm. Pulses: Normal pulses. Heart sounds: Normal heart sounds, S1 normal and S2 normal. No murmur. Pulmonary:      Effort: Pulmonary effort is normal. No respiratory distress. Breath sounds: Normal breath sounds and air entry. Abdominal:      General: Abdomen is flat. Bowel sounds are normal.      Palpations: Abdomen is soft. Tenderness: There is no abdominal tenderness. Musculoskeletal: Normal range of motion. General: Swelling and tenderness present. Comments: Swelling, tenderness, and bruising of the right proximal upper extremity   Skin:     General: Skin is warm and dry. Neurological:      General: No focal deficit present. Mental Status: He is alert and oriented to person, place, and time. Cranial Nerves: Cranial nerves are intact. Sensory: Sensation is intact. Motor: Motor function is intact. Coordination: Coordination is intact. Gait: Gait is intact. Deep Tendon Reflexes: Reflexes are normal and symmetric. Psychiatric:         Attention and Perception: Attention and perception normal.         Mood and Affect: Mood normal.         Speech: Speech normal.         Behavior: Behavior normal. Behavior is cooperative. Thought Content:  Thought content normal.         Cognition and Memory: Cognition and memory normal.         Judgment: Judgment normal.         Data:   Labs:  CBC:   Recent Labs     12/09/20 2002   WBC 7.9   HGB 15.3   HCT 45.9          BMP:   Recent Labs     12/09/20 2002      K 3.6    CO2 30   BUN 14   CREATININE 0.8*   GLUCOSE 95     LFT's: No results for input(s): AST, ALT, ALB, BILITOT, ALKPHOS in the last 72 hours. Troponin: No results for input(s): TROPONINI in the last 72 hours. BNP:No results for input(s): BNP in the last 72 hours. ABGs: No results for input(s): PHART, BWK1VSI, PO2ART in the last 72 hours. INR: No results for input(s): INR in the last 72 hours. U/A:No results for input(s): NITRITE, COLORU, PHUR, LABCAST, WBCUA, RBCUA, MUCUS, TRICHOMONAS, YEAST, BACTERIA, CLARITYU, SPECGRAV, LEUKOCYTESUR, UROBILINOGEN, BILIRUBINUR, BLOODU, GLUCOSEU, AMORPHOUS in the last 72 hours. Invalid input(s): KETONESU    XR SHOULDER RIGHT (MIN 2 VIEWS)   Final Result      Nondisplaced, comminuted fracture of the right proximal humerus. XR HUMERUS RIGHT (MIN 2 VIEWS)   Final Result      Comminuted, minimally displaced fracture of the proximal humerus. The mid to distal humerus is intact. XR CHEST (2 VW)   Final Result      No acute cardiopulmonary findings. ASSESSMENT AND PLAN:   Mr. Marlyne Cranker is a 18-year-old male with past medical history of anxiety who presents today for worsening right shoulder pain and a syncopal episode. Syncope, rule out seizure  Patient endorsing a syncopal event with fall 3 days prior to presentation. Current the morning after the patient was drinking alcohol. States he did not eat breakfast and only had some coffee with a glass of water. Endorses feeling dehydrated at this time. Patient denies low-level/bladder incontinence. Unsure of whether or not he bit his tongue. Patient does endorse heart murmur as a child and has pectus excavatum. Has not had an echocardiogram done previously. Patient does endorse a similar episode greater than 5 years ago where he had presyncope, but did not fall he did not present for medical treatment at this time.   · Neurology consulted for evaluation  · Continue Telemetry  · F/U Echocardiogram    Fracture of the proximal humerus. Patient has a minimally displaced fracture of the proximal humerus occurring due to fall 2/2 syncopal episode.    · Orthopedic surgery consulted  · Pain control: Ultram 50 mg every 6 hours    Anxiety  · Continue home BuSpar and Zoloft    Code Status: Full  FEN: General  PPX: Lovenox  DISPO: General Medical Floor    This patient has been staffed and discussed with Dr. Lulu Bishop,   -----------------------------  Davida Wilson MD, PGY-2  12/9/2020  10:22 PM

## 2020-12-10 NOTE — ED PROVIDER NOTES
ED Attending Attestation Note     Date of evaluation: 12/9/2020    This patient was seen by the resident. I have seen and examined the patient, agree with the workup, evaluation, management and diagnosis. The care plan has been discussed. I have reviewed the ECG and concur with the resident's interpretation. My assessment reveals 76-year-old male with no known past medical history who presents with right shoulder pain and loss of consciousness. Patient apparently was working at his laptop 3 days ago when he stood up, felt lightheaded and lost consciousness for an unknown amount of time. Awoke on his couch, did not remember how he got there. Noted that the chair was tipped over and his glass of water was spilled. Since that time he has had worsening right arm pain, swelling, and ecchymosis. Went to an urgent care for initial evaluation but was advised to be seen in the ED. Currently other than the right arm pain does not have any specific symptoms and on exam has swelling, ecchymosis, and pain of the right proximal arm and is neurovascularly intact in the extremity. Extremely limited active range of motion secondary to pain with also limited passive range of motion for the same reason. Pupils are mydriatic but equal.  He is intermittently tachycardic from the low 100s to 120s but normotensive, normal respirations on room air. Otherwise neurologic exam and remainder of physical exam is noncontributory. Plain films of the right humerus and shoulder demonstrated a proximal humerus fracture. Splint applied in the ED. Remainder of the work-up in the ED unrevealing for underlying etiology. Differential includes syncope versus new onset seizure. Case was discussed with neurology, patient will be admitted to the hospital for further workup. Note that CT head in the ED was deferred.  Patients episode occurred three days ago with no recurrence, no ongoing neurologic symptoms, and he currently has a

## 2020-12-11 ENCOUNTER — APPOINTMENT (OUTPATIENT)
Dept: MRI IMAGING | Age: 25
DRG: 204 | End: 2020-12-11
Payer: COMMERCIAL

## 2020-12-11 VITALS
WEIGHT: 185 LBS | HEART RATE: 85 BPM | OXYGEN SATURATION: 97 % | BODY MASS INDEX: 29.73 KG/M2 | HEIGHT: 66 IN | DIASTOLIC BLOOD PRESSURE: 69 MMHG | SYSTOLIC BLOOD PRESSURE: 108 MMHG | RESPIRATION RATE: 18 BRPM | TEMPERATURE: 97.6 F

## 2020-12-11 LAB
ANION GAP SERPL CALCULATED.3IONS-SCNC: 9 MMOL/L (ref 3–16)
BASOPHILS ABSOLUTE: 0.1 K/UL (ref 0–0.2)
BASOPHILS RELATIVE PERCENT: 1 %
BUN BLDV-MCNC: 15 MG/DL (ref 7–20)
CALCIUM SERPL-MCNC: 9.7 MG/DL (ref 8.3–10.6)
CHLORIDE BLD-SCNC: 102 MMOL/L (ref 99–110)
CO2: 28 MMOL/L (ref 21–32)
CREAT SERPL-MCNC: 0.9 MG/DL (ref 0.9–1.3)
EOSINOPHILS ABSOLUTE: 0.3 K/UL (ref 0–0.6)
EOSINOPHILS RELATIVE PERCENT: 4.9 %
GFR AFRICAN AMERICAN: >60
GFR NON-AFRICAN AMERICAN: >60
GLUCOSE BLD-MCNC: 97 MG/DL (ref 70–99)
HCT VFR BLD CALC: 44 % (ref 40.5–52.5)
HEMOGLOBIN: 14.9 G/DL (ref 13.5–17.5)
LACTIC ACID: 1 MMOL/L (ref 0.4–2)
LYMPHOCYTES ABSOLUTE: 2 K/UL (ref 1–5.1)
LYMPHOCYTES RELATIVE PERCENT: 28.8 %
MCH RBC QN AUTO: 30.2 PG (ref 26–34)
MCHC RBC AUTO-ENTMCNC: 33.9 G/DL (ref 31–36)
MCV RBC AUTO: 89.2 FL (ref 80–100)
MONOCYTES ABSOLUTE: 0.5 K/UL (ref 0–1.3)
MONOCYTES RELATIVE PERCENT: 7 %
NEUTROPHILS ABSOLUTE: 4 K/UL (ref 1.7–7.7)
NEUTROPHILS RELATIVE PERCENT: 58.3 %
PDW BLD-RTO: 11.7 % (ref 12.4–15.4)
PLATELET # BLD: 211 K/UL (ref 135–450)
PMV BLD AUTO: 7.1 FL (ref 5–10.5)
POTASSIUM REFLEX MAGNESIUM: 4.2 MMOL/L (ref 3.5–5.1)
RBC # BLD: 4.93 M/UL (ref 4.2–5.9)
SODIUM BLD-SCNC: 139 MMOL/L (ref 136–145)
TOTAL CK: 826 U/L (ref 39–308)
WBC # BLD: 6.8 K/UL (ref 4–11)

## 2020-12-11 PROCEDURE — G0378 HOSPITAL OBSERVATION PER HR: HCPCS

## 2020-12-11 PROCEDURE — 6370000000 HC RX 637 (ALT 250 FOR IP)

## 2020-12-11 PROCEDURE — 6360000002 HC RX W HCPCS

## 2020-12-11 PROCEDURE — 6360000004 HC RX CONTRAST MEDICATION: Performed by: NURSE PRACTITIONER

## 2020-12-11 PROCEDURE — A9576 INJ PROHANCE MULTIPACK: HCPCS | Performed by: NURSE PRACTITIONER

## 2020-12-11 PROCEDURE — 36415 COLL VENOUS BLD VENIPUNCTURE: CPT

## 2020-12-11 PROCEDURE — 2580000003 HC RX 258

## 2020-12-11 PROCEDURE — 85025 COMPLETE CBC W/AUTO DIFF WBC: CPT

## 2020-12-11 PROCEDURE — 83605 ASSAY OF LACTIC ACID: CPT

## 2020-12-11 PROCEDURE — 96372 THER/PROPH/DIAG INJ SC/IM: CPT

## 2020-12-11 PROCEDURE — 70553 MRI BRAIN STEM W/O & W/DYE: CPT

## 2020-12-11 PROCEDURE — 82550 ASSAY OF CK (CPK): CPT

## 2020-12-11 PROCEDURE — 80048 BASIC METABOLIC PNL TOTAL CA: CPT

## 2020-12-11 RX ORDER — TRAMADOL HYDROCHLORIDE 50 MG/1
50 TABLET ORAL EVERY 6 HOURS PRN
Qty: 12 TABLET | Refills: 0 | Status: SHIPPED | OUTPATIENT
Start: 2020-12-11 | End: 2020-12-14

## 2020-12-11 RX ADMIN — BUSPIRONE HYDROCHLORIDE 10 MG: 10 TABLET ORAL at 08:48

## 2020-12-11 RX ADMIN — GADOTERIDOL 18 ML: 279.3 INJECTION, SOLUTION INTRAVENOUS at 11:09

## 2020-12-11 RX ADMIN — SERTRALINE HYDROCHLORIDE 50 MG: 50 TABLET ORAL at 08:48

## 2020-12-11 RX ADMIN — Medication 10 ML: at 08:49

## 2020-12-11 RX ADMIN — TRAMADOL HYDROCHLORIDE 50 MG: 50 TABLET, FILM COATED ORAL at 03:19

## 2020-12-11 RX ADMIN — TRAMADOL HYDROCHLORIDE 50 MG: 50 TABLET, FILM COATED ORAL at 14:27

## 2020-12-11 RX ADMIN — ENOXAPARIN SODIUM 40 MG: 40 INJECTION SUBCUTANEOUS at 08:48

## 2020-12-11 ASSESSMENT — PAIN SCALES - GENERAL
PAINLEVEL_OUTOF10: 6
PAINLEVEL_OUTOF10: 7

## 2020-12-11 NOTE — PLAN OF CARE
Problem: Falls - Risk of:  Goal: Will remain free from falls  Description: Will remain free from falls  12/11/2020 1236 by Ana Bro RN  Outcome: Ongoing     Problem: Pain:  Goal: Pain level will decrease  Description: Pain level will decrease  12/11/2020 1236 by Ana Bro RN  Outcome: Ongoing

## 2020-12-11 NOTE — PROGRESS NOTES
Patient discharged with belongings, pain medication prescription, and follow up instructions. IV removed without complications. Patient transported to main entrance via wheelchair[facility]

## 2020-12-11 NOTE — PROGRESS NOTES
PGY-1 Resident Progress Note  [facility] [employer]    Admit Date: 12/9/2020       CC: R. Shoulder pain    Overnight Events:  No acute events overnight. Patient endorses that pain is controlled with R. shoulder soreness but otherwise feeling well. Patient denies fever, nausea, vomiting, chest pain, dizziness, headache. Family Hx: No family history on file. Scheduled Medications:    busPIRone  10 mg Oral BID    sertraline  50 mg Oral Daily    sodium chloride flush  10 mL Intravenous 2 times per day    enoxaparin  40 mg Subcutaneous Daily      PRN Medications: perflutren lipid microspheres, sodium chloride flush, acetaminophen **OR** acetaminophen, polyethylene glycol, promethazine **OR** ondansetron, traMADol  Diet: DIET GENERAL;    Continuous Infusions:    PHYSICAL EXAM:  /65   Pulse 85   Temp 97.5 °F (36.4 °C) (Oral)   Resp 18   Ht 5' 6\" (1.676 m)   Wt 185 lb (83.9 kg)   SpO2 97%   BMI 29.86 kg/m²   No results for input(s): POCGLU in the last 72 hours. Intake/Output Summary (Last 24 hours) at 12/11/2020 0914  Last data filed at 12/10/2020 1400  Gross per 24 hour   Intake 120 ml   Output --   Net 120 ml       Physical Exam  Constitutional:       General: He is not in acute distress. Appearance: Normal appearance. Eyes:      Extraocular Movements: Extraocular movements intact. Conjunctiva/sclera: Conjunctivae normal.      Pupils: Pupils are equal, round, and reactive to light. Neck:      Musculoskeletal: Normal range of motion and neck supple. No neck rigidity. Cardiovascular:      Rate and Rhythm: Normal rate and regular rhythm. Pulmonary:      Effort: Pulmonary effort is normal. No respiratory distress. Breath sounds: Normal breath sounds. Abdominal:      General: Abdomen is flat. Bowel sounds are normal.      Palpations: Abdomen is soft. Skin:     General: Skin is warm and dry. Neurological:      General: No focal deficit present.       Mental Status: He is oriented to person, place, and time. LABS:  Recent Labs     12/09/20  2002 12/10/20  0549 12/11/20  0534   WBC 7.9 6.2 6.8   HGB 15.3 13.9 14.9   HCT 45.9 41.2 44.0    189 211                                                                    Recent Labs     12/09/20  2002 12/10/20  0549 12/11/20  0534    140 139   K 3.6 3.9 4.2    104 102   CO2 30 27 28   BUN 14 12 15   CREATININE 0.8* 0.8* 0.9   GLUCOSE 95 99 97     No results for input(s): AST, ALT, ALB, BILITOT, ALKPHOS in the last 72 hours. No results for input(s): TROPONINI in the last 72 hours. No results for input(s): BNP in the last 72 hours. No results for input(s): CHOL, HDL in the last 72 hours. Invalid input(s): LDLCALCU  No results for input(s): INR in the last 72 hours. Imaging:          XR SHOULDER RIGHT (MIN 2 VIEWS)   Final Result      Nondisplaced, comminuted fracture of the right proximal humerus. XR HUMERUS RIGHT (MIN 2 VIEWS)   Final Result      Comminuted, minimally displaced fracture of the proximal humerus. The mid to distal humerus is intact. XR CHEST (2 VW)   Final Result      No acute cardiopulmonary findings. MRI BRAIN W WO CONTRAST    (Results Pending)         Assessment & Plan:    Daria Herrera is a 31 y.o. male that presents for R.  Shoulder pain    # Syncope r/o seizure  - No hx seizures  - Neuro consulted  - Continue telemetry, bouts of tachycardia noted  - Echocardiogram: EF 55-60%, mild MR  - Trend CK: elevated 2167  - EEG: mild generalized slowing, absence of epileptiform discharges   - f/u Brain MRI w/ w/o    # Fracture of Proximal Humerus  - Orthopedic surgery consulted  - Pain control Ultram 50 mg every 6 hrs    # Anxiety  - Continue home BuSpar and Zoloft    # Alcohol abuse  - CIWA monitoring          Code Status: Full Code  FEN: IVF: none; DIET GENERAL;  PPX:Lovenox       This patient will be discussed with attending, Mac Kraft MD.    Antonio Speaks,

## 2020-12-11 NOTE — DISCHARGE SUMMARY
INTERNAL MEDICINE DEPARTMENT AT [address] [place] [address]  DISCHARGE SUMMARY    Patient ID: Aliya De                                             Discharge Date: 12/11/2020   Patient's PCP: No primary care provider on file. Discharge Physician: Colt Guevara MD  Admit Date: 12/9/2020   Admitting Physician: Julian Farr MD    DISCHARGE DIAGNOSES:  1. Loss of consciousness ([address]    2. Other closed nondisplaced fracture of proximal end of right humerus, initial encounter          Hospital Course:  Drea Post is a 30 yo male with a PMHx of anxiety, pectus excavatum presented with a syncopal episode 3 days ago and R arm pain with bruising. X-rays of R humerus showed minimally displaced fracture of the proximal humerus. EEG showed generalized slowing of theta wave activity but not suggestive of seizure. The patient was started on tramadol and the pain remained stable. MRI Brain w/ w/o contrast showed no acute intracranial abnormality or mass and mild parasinal sinus mucosal disease. Ortho was consulted and stated they would not be doing any intervention. Pt to f/u with orthopedics outpatient for repeat films and PT therapy in 2 weeks. Patient to maintain sling and swathe per ortho instructions. On the date of discharge, the patient reported feeling stable. The patient was found to not be in any acute distress, with vital signs within normal limits, and no new abnormalities on physical examination. . Further, the patient expressed appropriate understanding of, and agreement with, the discharge recommendations, medications, and plan. Physical Exam:  /69   Pulse 85   Temp 97.6 °F (36.4 °C) (Oral)   Resp 18   Ht 5' 6\" (1.676 m)   Wt 185 lb (83.9 kg)   SpO2 97%   BMI 29.86 kg/m²     Constitutional:       General: He is not in acute distress. HENT:      Head: Normocephalic and atraumatic.    Eyes:      General: No scleral icterus.     Extraocular Movements: Extraocular movements intact.      Pupils: Pupils are equal, round, and reactive to light. Neck:      Musculoskeletal: Normal range of motion and neck supple. Cardiovascular:      Rate and Rhythm: Regular rate & Regular rhythm.     Heart sounds: No murmur. No friction rub. No gallop.    Pulmonary:      Effort: No respiratory distress.      Breath sounds: Normal breath sounds. No wheezing, rhonchi or rales. Abdominal:      General: Abdomen is flat.      Palpations: Abdomen is soft.      Tenderness: There is no abdominal tenderness. There is no guarding or rebound. Musculoskeletal:      Comments: Mild tenderness to the right posterior shoulder.  Mild swelling to the right shoulder and right upper arm with ecchymosis overlying the right upper arm anteriorly.  Right radial pulse intact.  No pain with range of motion of the right elbow or wrist.  Mild pain with passive range of motion of the right elbow.  His active range of motion is limited significantly by pain.  No tenderness to the long bones of the arms.   Skin:     General: Skin is warm.      Capillary Refill: Capillary refill takes less than 2 seconds. Neurological:      General: No focal deficit present.      Mental Status: He is alert and oriented to person, place, and time.      Cranial Nerves: No cranial nerve deficit. Psychiatric: Redgie Canner and Affect: Mood normal.         Behavior: Behavior normal.     Consults: orthopedic surgery  Disposition: home  Discharged Condition: Stable  Follow Up: Primary Care Physician in one week    DISCHARGE MEDICATION:     Medication List      START taking these medications    traMADol 50 MG tablet  Commonly known as:  ULTRAM  Take 1 tablet by mouth every 6 hours as needed for Pain for up to 3 days.         CONTINUE taking these medications    busPIRone 10 MG tablet  Commonly known as:  BUSPAR     Claritin 10 MG tablet  Generic drug:  loratadine     sertraline 50 MG tablet  Commonly known as:  ZOLOFT Where to Get Your Medications      You can get these medications from any pharmacy    Bring a paper prescription for each of these medications  · traMADol 50 MG tablet       Activity: activity as tolerated  Diet: regular diet  Wound Care: as directed    Time Spent on discharge is more than 45 minutes    Signed:  Jennifer Mcmullen MD   Internal Medicine, PGY-1  12/11/2020

## 2020-12-11 NOTE — PROGRESS NOTES
Progress Note  The patient is being evaluated for syncope vs. Seizure. Updates  Covid 19: Not detected. Per Ortho no surgery needed at this time. Routine EEG with generalized slowing. Per patient no events overnight. Active Ambulatory Problems     Diagnosis Date Noted    No Active Ambulatory Problems     Resolved Ambulatory Problems     Diagnosis Date Noted    No Resolved Ambulatory Problems     No Additional Past Medical History       Current Facility-Administered Medications:     perflutren lipid microspheres (DEFINITY) injection 1.65 mg, 1.5 mL, Intravenous, ONCE PRN, Alina Espinoza MD    busPIRone (BUSPAR) tablet 10 mg, 10 mg, Oral, BID, Alina Espinoza MD, 10 mg at 12/10/20 2046    sertraline (ZOLOFT) tablet 50 mg, 50 mg, Oral, Daily, Alina Espinoza MD, 50 mg at 12/10/20 0827    sodium chloride flush 0.9 % injection 10 mL, 10 mL, Intravenous, 2 times per day, Alina Espinoza MD, 10 mL at 12/10/20 0828    sodium chloride flush 0.9 % injection 10 mL, 10 mL, Intravenous, PRN, Alina Espinoza MD    acetaminophen (TYLENOL) tablet 650 mg, 650 mg, Oral, Q6H PRN **OR** acetaminophen (TYLENOL) suppository 650 mg, 650 mg, Rectal, Q6H PRN, Alina Espinoza MD    polyethylene glycol (GLYCOLAX) packet 17 g, 17 g, Oral, Daily PRN, Alina Espinoza MD    promethazine (PHENERGAN) tablet 12.5 mg, 12.5 mg, Oral, Q6H PRN **OR** ondansetron (ZOFRAN) injection 4 mg, 4 mg, Intravenous, Q6H PRN, Alina Espinoza MD    enoxaparin (LOVENOX) injection 40 mg, 40 mg, Subcutaneous, Daily, Alina Espinoza MD, 40 mg at 12/10/20 0827    traMADol (ULTRAM) tablet 50 mg, 50 mg, Oral, Q6H PRN, Alina Espinoza MD, 50 mg at 12/11/20 0319      ROS -   Constitutional- No weight loss or fevers  Eyes- No diplopia. No photophobia. Ears/nose/throat- No dysphagia. No Dysarthria  Cardiovascular- No palpitations. No chest pain  Neurologic- + RUE weakness. No Headache.      busPIRone 10 mg Oral BID    sertraline  50 mg Oral Daily    sodium chloride flush  10 mL Intravenous 2 times per day    enoxaparin  40 mg Subcutaneous Daily             perflutren lipid microspheres, sodium chloride flush, acetaminophen **OR** acetaminophen, polyethylene glycol, promethazine **OR** ondansetron, traMADol     Exam:  Blood pressure 105/65, pulse 85, temperature 97.5 °F (36.4 °C), temperature source Oral, resp. rate 18, height 5' 6\" (1.676 m), weight 185 lb (83.9 kg), SpO2 97 %. Constitutional    Vital signs: BP, HR, and RR reviewed   General Alert, no distress, well-nourished  Eyes: unable to visualize the fundi  Cardiovascular: pulses symmetric in all 4 extremities. No peripheral edema. Psychiatric: cooperative with examination, no  psychotic behavior noted. Neurologic  Mental status:   orientation to person, place, time and situation              General fund of knowledge:  grossly intact              Memory: Short term and long term intact              Attention intact as able to attend well to the exam                Language fluent in conversation              Comprehension intact; follows simple commands  Cranial nerves:   CN2: Visual Fields full w/o extinction on confrontational testing,   CN 3,4,6: extraocular muscles intact  CN7: upper and lower facial symmetric without dysarthria  CN8: hearing grossly intact to conversation. CN12: tongue midline with protrusion. Able to move tongue side to side. Strength: Grasp: BUE 5/5 . RUE: Limited assessment r/t right humerus fracture. 3/5  elbow flexion, elbow extension. LUE: 5/5  Shoulder abduction, elbow flexion, elbow extension. RLE: 5/5 Hip flexion, Knee flexion, Knee extension. LLE: 5/5 Hip flexion, Knee flexion, Knee extension. Sensory: light touch intact in all 4 extremities. No sensory extinction on double simultaneous stimulation  Cerebellar/coordination: finger nose finger normal without ataxia in LUE. RUE deferred for comfort. Tone: normal in all LUE and BLE extremities. RUE deferred for comfort. Gait: deferred for safety. Labs  Na: 139  K: 4.2  BUN: 15  Creatinine: 0.9  Glucose: 97  Ca: 9.7    CK: 2167    WBC: 6.8  RBC: 4.93  Hgb: 14.9  Hct: 44.0  Platelet: 230    Covid 19: Not detected    Studies  Routine EEG 12/10/20: EEG Interpretation:   The EEG was abnormal due to the presence of: mild generalized slowing  Generalized slowing is a non-specific finding consistent with a generalized disturbance of cerebral functioning including toxic, metabolic, or structural abnormalities that are multi-focal or diffuse. ECHO 12/10/20:  Normal left ventricle size, wall thickness, and systolic function with an  estimated ejection fraction of 55-60%. No regional wall motion abnormalities are seen. Normal diastolic function. Eccentric mild mitral regurgitation is present. Estimated pulmonary artery systolic pressure is at 22 mmHg assuming a right atrial pressure of 3 mmHg. Impression  1. Loss of consciousness with prolonged amnesia and tongue trauma. 2. EtOH abuse. 3. Right Humerus fracture. 4. Drug abuse   5. Remote hx of presyncope.      Karina Wisdom is a 31 y.o. male who presents for evaluation of right shoulder pain s/p suspect fall with loss of consciousness with prolonged amnesia and tongue trauma, 3 days ago. Found to have proximal right humerus fracture. Patient reported heavy EtOH consumption night prior to event. He does have tongue trauma. He does have remote hx of prior presyncope event.      Etiology: cannot exclude seizure given prolonged amnesia to event, and tongue trauma. Provoked in setting of recent heavy EtOH consumption. Recommendations  - Awaiting MRI Brain w/ w/o. If unrevealing then no further work up. - Obtain orthostatic vitals. Discussed with nursing.   - Agree with trending CK, defer to primary team.   - CIWA monitoring and management primary team.   - Seizure precautions.     - Given the patient had an altered level of awareness, the patient should not drive for at least 3 months, episode free with clearance from a physician, as well as refraining from activities that could be of danger to himself or others should her have another seizure such as swimming, bathing, operating heavy machinery, climbing heights or watching young children who do not have the ability to call for help if needed. He verbalized understanding.   - If spells were to persist patient should follow up with Neurology with consideration of AED therapy. Discussed with patient. ADDENDUM @ 4509 MRI Brain w/ w/o unremarkable. Orthostatic vitals negative. Will sign off. Please do not hesitate to call back with any questions or concerns.      Porsche Dia, [address] Neurology    A copy of this note was provided for Dr Albina Chadwick MD

## 2020-12-11 NOTE — PROGRESS NOTES
Patient A&Ox4, VSS. Neuro checks within normal limits, pt denies numbness or tingling. Pain managed per MAR. Patient ambulates with standby assist, tolerates well. Patient anticipating discharge home today. Will continue to monitor.

## 2020-12-28 ENCOUNTER — OFFICE VISIT (OUTPATIENT)
Dept: INTERNAL MEDICINE CLINIC | Age: 25
End: 2020-12-28
Payer: COMMERCIAL

## 2020-12-28 VITALS
DIASTOLIC BLOOD PRESSURE: 87 MMHG | HEIGHT: 71 IN | WEIGHT: 159.1 LBS | HEART RATE: 115 BPM | BODY MASS INDEX: 22.27 KG/M2 | RESPIRATION RATE: 20 BRPM | TEMPERATURE: 97.2 F | SYSTOLIC BLOOD PRESSURE: 125 MMHG | OXYGEN SATURATION: 97 %

## 2020-12-28 PROCEDURE — 99213 OFFICE O/P EST LOW 20 MIN: CPT | Performed by: STUDENT IN AN ORGANIZED HEALTH CARE EDUCATION/TRAINING PROGRAM

## 2020-12-28 ASSESSMENT — ENCOUNTER SYMPTOMS
NAUSEA: 0
SHORTNESS OF BREATH: 0
VOMITING: 0
CHOKING: 0
COUGH: 0
BACK PAIN: 0
DIARRHEA: 0
WHEEZING: 0

## 2020-12-28 NOTE — PROGRESS NOTES
Outpatient Clinic Visit Note    Patient: Niya Pérez  : 1995 (31 y.o.)  Date: 2020    CC: Hospital follow-up    HPI:      3 weeks ago patient had a fall at home with loss of consciousness. Patient had been drinking got up and then fell to floor. He lost consciousness but at some point got up and fell asleep on the couch after falling to the floor. Patient's arm hurt following his period of LOC. He presented to the ED and was found to have a mildly displace fracture. HE was admitted and worked up for syncope. MRI and EEG were negative for acute changes or seizure, Patient discharged and told to follow up with PCP. He is unable to get an appointment with his PCP so he came to the [facility] outpatient clinic. Patient has had no repeated episodes of syncope. He is drinking more water. He has been drinking a lot of coffee as well. He notes that he is cutting back on alcohol. He has had increase consumption during the pandemic as he is not working and just staying at home. Plans to follow up and continue care with his PCP. In clinic today he was mildly tachycardic, appears fluid down. Patient will follow up with PCP and cut back on caffeine and drink more water and be reevaluated. Father has recent history of issues with tachycardia but did not start until he was older. Echo in hospital negative for cardiomegaly or other structural abnormalities. ROS:  Review of Systems   Constitutional: Negative for activity change, fatigue and fever. Eyes: Negative for visual disturbance. Respiratory: Negative for cough, choking, shortness of breath and wheezing. Cardiovascular: Negative for chest pain, palpitations and leg swelling. Gastrointestinal: Negative for diarrhea, nausea and vomiting. Endocrine: Negative for polyuria. Genitourinary: Negative for dysuria, frequency and hematuria. Musculoskeletal: Negative for arthralgias, back pain, joint swelling and myalgias.    Neurological: Negative for dizziness, tremors, seizures, syncope, weakness, light-headedness, numbness and headaches. Psychiatric/Behavioral: Negative for agitation, confusion and decreased concentration. Past Medical History:    No past medical history on file. Past Surgical History:  No past surgical history on file. Home Medications:  Prior to Visit Medications    Medication Sig Taking? Authorizing Provider   busPIRone (BUSPAR) 10 MG tablet Take 10 mg by mouth 2 times daily Yes Historical Provider, MD   sertraline (ZOLOFT) 50 MG tablet Take 50 mg by mouth daily Yes Historical Provider, MD   loratadine (CLARITIN) 10 MG tablet Take 10 mg by mouth daily Yes Historical Provider, MD        Allergies:    Nuts [peanut-containing drug products]    Family History:   No family history on file. Social History:  Social History     Tobacco Use    Smoking status: Not on file   Substance Use Topics    Alcohol use: Not on file    Drug use: Not on file       Data: Old records have been reviewed electronically. PHYSICAL EXAM:  /87 (Site: Left Upper Arm, Position: Sitting, Cuff Size: Large Adult)   Pulse 115   Temp 97.2 °F (36.2 °C) (Temporal)   Resp 20   Ht 5' 11\" (1.803 m)   Wt 159 lb 1.6 oz (72.2 kg)   SpO2 97%   BMI 22.19 kg/m²   Physical Exam  Constitutional:       General: He is not in acute distress. Appearance: He is not diaphoretic. HENT:      Head: Normocephalic and atraumatic. Mouth/Throat:      Mouth: Mucous membranes are dry. Pharynx: No oropharyngeal exudate or posterior oropharyngeal erythema. Eyes:      General: No scleral icterus. Extraocular Movements: Extraocular movements intact. Conjunctiva/sclera: Conjunctivae normal.      Pupils: Pupils are equal, round, and reactive to light. Cardiovascular:      Rate and Rhythm: Regular rhythm. Tachycardia present. Pulses: Normal pulses. Heart sounds: No murmur. No gallop.     Pulmonary:      Effort: Pulmonary effort is normal.      Breath sounds: Normal breath sounds. Abdominal:      General: Bowel sounds are normal. There is no distension. Palpations: Abdomen is soft. Musculoskeletal:      Right lower leg: No edema. Left lower leg: No edema. Comments: R arm in sling. ROM of hand normal without pain. Pulse 2+ bilaterally, good cap refill. Neurological:      General: No focal deficit present. Mental Status: He is alert and oriented to person, place, and time. Health Maintanence:  Health Maintenance   Topic Date Due    Hepatitis C screen  1995    Varicella vaccine (1 of 2 - 2-dose childhood series) 06/01/1995    HPV vaccine (1 - Male 2-dose series) 06/01/2005    HIV screen  06/01/2009    Flu vaccine (1) 09/01/2020    DTaP/Tdap/Td vaccine (2 - Td) 03/13/2030    Hepatitis A vaccine  Aged Out    Hepatitis B vaccine  Aged Out    Hib vaccine  Aged Out    Meningococcal (ACWY) vaccine  Aged Out    Pneumococcal 0-64 years Vaccine  Aged Out       Assessment & Plan:      Alessandro Echavarria was seen today for follow-up from hospital.    Diagnoses and all orders for this visit:    Postural syncope  - Workup in hospital negative for cardiac cause  - no further episodes. Patient instructed to cut back on caffeine and alcohol. Patient in agreement. Drink increased amounts of water up to 2L daily     Dispo: Pt has been staffed with Dr. Josh Covarrubias  _______________  Lorita Boast, DO  Internal Medicine, PGY-3  12/28/2020 2:55 PM     Addendum to Resident H& P/Progress note:  I have personally seen,examined and evaluated the patient.  I have reviewed the current history, physical findings, labs and assessment and plan and agree with note as documented by resident MD ( Wendy Patino)      Gladys Rowe MD, [address]

## 2021-11-06 ENCOUNTER — HOSPITAL ENCOUNTER (EMERGENCY)
Age: 26
Discharge: HOME | End: 2021-11-06
Attending: STUDENT IN AN ORGANIZED HEALTH CARE EDUCATION/TRAINING PROGRAM
Payer: COMMERCIAL

## 2021-11-06 VITALS
TEMPERATURE: 97.9 F | HEART RATE: 51 BPM | OXYGEN SATURATION: 100 % | DIASTOLIC BLOOD PRESSURE: 75 MMHG | SYSTOLIC BLOOD PRESSURE: 131 MMHG | RESPIRATION RATE: 18 BRPM

## 2021-11-06 DIAGNOSIS — R55 SYNCOPE AND COLLAPSE: Primary | ICD-10-CM

## 2021-11-06 LAB
ANION GAP SERPL CALCULATED.3IONS-SCNC: 11 MMOL/L (ref 3–16)
BASOPHILS ABSOLUTE: 0 K/UL (ref 0–0.2)
BASOPHILS RELATIVE PERCENT: 0.5 %
BUN BLDV-MCNC: 11 MG/DL (ref 7–20)
CALCIUM SERPL-MCNC: 9.5 MG/DL (ref 8.3–10.6)
CHLORIDE BLD-SCNC: 98 MMOL/L (ref 99–110)
CO2: 27 MMOL/L (ref 21–32)
CREAT SERPL-MCNC: 0.8 MG/DL (ref 0.9–1.3)
EKG ATRIAL RATE: 98 BPM
EKG DIAGNOSIS: NORMAL
EKG P AXIS: 68 DEGREES
EKG P-R INTERVAL: 158 MS
EKG Q-T INTERVAL: 316 MS
EKG QRS DURATION: 78 MS
EKG QTC CALCULATION (BAZETT): 403 MS
EKG R AXIS: 6 DEGREES
EKG T AXIS: 49 DEGREES
EKG VENTRICULAR RATE: 98 BPM
EOSINOPHILS ABSOLUTE: 0.4 K/UL (ref 0–0.6)
EOSINOPHILS RELATIVE PERCENT: 7.4 %
GFR AFRICAN AMERICAN: >60
GFR NON-AFRICAN AMERICAN: >60
GLUCOSE BLD-MCNC: 123 MG/DL (ref 70–99)
GLUCOSE BLD-MCNC: 98 MG/DL (ref 70–99)
HCT VFR BLD CALC: 48.4 % (ref 40.5–52.5)
HEMOGLOBIN: 16.9 G/DL (ref 13.5–17.5)
LYMPHOCYTES ABSOLUTE: 1.4 K/UL (ref 1–5.1)
LYMPHOCYTES RELATIVE PERCENT: 25 %
MCH RBC QN AUTO: 30.8 PG (ref 26–34)
MCHC RBC AUTO-ENTMCNC: 35 G/DL (ref 31–36)
MCV RBC AUTO: 88.1 FL (ref 80–100)
MONOCYTES ABSOLUTE: 0.4 K/UL (ref 0–1.3)
MONOCYTES RELATIVE PERCENT: 6.8 %
NEUTROPHILS ABSOLUTE: 3.5 K/UL (ref 1.7–7.7)
NEUTROPHILS RELATIVE PERCENT: 60.3 %
PDW BLD-RTO: 12.5 % (ref 12.4–15.4)
PERFORMED ON: NORMAL
PLATELET # BLD: 237 K/UL (ref 135–450)
PMV BLD AUTO: 7.3 FL (ref 5–10.5)
POTASSIUM REFLEX MAGNESIUM: 3.9 MMOL/L (ref 3.5–5.1)
RBC # BLD: 5.49 M/UL (ref 4.2–5.9)
SODIUM BLD-SCNC: 136 MMOL/L (ref 136–145)
WBC # BLD: 5.8 K/UL (ref 4–11)

## 2021-11-06 PROCEDURE — 80048 BASIC METABOLIC PNL TOTAL CA: CPT

## 2021-11-06 PROCEDURE — 85025 COMPLETE CBC W/AUTO DIFF WBC: CPT

## 2021-11-06 PROCEDURE — 2580000003 HC RX 258: Performed by: STUDENT IN AN ORGANIZED HEALTH CARE EDUCATION/TRAINING PROGRAM

## 2021-11-06 PROCEDURE — 93005 ELECTROCARDIOGRAM TRACING: CPT | Performed by: STUDENT IN AN ORGANIZED HEALTH CARE EDUCATION/TRAINING PROGRAM

## 2021-11-06 PROCEDURE — 99283 EMERGENCY DEPT VISIT LOW MDM: CPT

## 2021-11-06 RX ORDER — SODIUM CHLORIDE, SODIUM LACTATE, POTASSIUM CHLORIDE, AND CALCIUM CHLORIDE .6; .31; .03; .02 G/100ML; G/100ML; G/100ML; G/100ML
1000 INJECTION, SOLUTION INTRAVENOUS ONCE
Status: COMPLETED | OUTPATIENT
Start: 2021-11-06 | End: 2021-11-06

## 2021-11-06 RX ADMIN — SODIUM CHLORIDE, SODIUM LACTATE, POTASSIUM CHLORIDE, AND CALCIUM CHLORIDE 1000 ML: .6; .31; .03; .02 INJECTION, SOLUTION INTRAVENOUS at 11:17

## 2021-11-06 NOTE — ED PROVIDER NOTES
ED Attending Attestation Note     Date of evaluation: 11/6/2021    This patient was seen by the resident. I have seen and examined the patient, agree with the workup, evaluation, management and diagnosis. The care plan has been discussed. I have reviewed the ECG and concur with the resident's interpretation. My assessment reveals a pleasant male who presents with an episode of syncope. He did not have any postictal state and feels that he was only briefly unconscious. He is moving all 4 extremities symmetrically. He has clear speech. He is alert and oriented. He has no soft touch sensory deficits in his extremities. He has been worked up in the past for similar episode. We will give him IV fluids and check labs.      Rosemary Sparrow MD  11/06/21 3280

## 2021-11-06 NOTE — Clinical Note
Bogadn Andersen was seen and treated in our emergency department on 11/6/2021. He may return to work on 11/07/2021. If you have any questions or concerns, please don't hesitate to call.       Rayna Watt MD

## 2021-11-06 NOTE — ED PROVIDER NOTES
[address] [facility] RESIDENT NOTE       Date of evaluation: 11/6/2021    Chief Complaint     Loss of Consciousness (sycopal episode at work today while handing a tray to a co-worker. Last time this happened was last december)      History of Present Illness     Miguel Snow is a 31 y.o. male with past medical history significant for syncope, depression who presents for syncope. He was actually admitted for a syncopal episode associated with binge drinking and December 2020, had negative MRI brain with and without, echocardiogram with eccentric mild mitral regurg. He does have a history of pectus excavatum. Patient presents for syncopal episode that occurred at work today. He reports that last night, he had about 4-5 drinks over a period of 6 hours and went to bed feeling normal.  He woke up in the middle night with feelings of palpitations and tachycardia, went back to sleep. 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 He landed slowly and had his back on the way down but not hit his head. He thinks he is only out for 1 to 2 seconds did not have a prolonged period. Noreen Uribe He did not feel confused after this episode. This is similar to his episode of syncope approximately 1 year ago and has not had any further syncopal episodes since that point time; his prior one was also precipitated by drinking the night before/previously. No recent fevers or chills, leg swelling, orthopnea, history of DVT or PE, hemoptysis. He feels like his normal self other than being just a little bit off and tired right now. Review of Systems     A complete review of systems was performed and is otherwise negative. Past Medical, Surgical, Family, and Social History     He has a past medical history of Depression and Syncope.   He has no past surgical history on Ref Range    Sodium 136 136 - 145 mmol/L    Potassium reflex Magnesium 3.9 3.5 - 5.1 mmol/L    Chloride 98 (L) 99 - 110 mmol/L    CO2 27 21 - 32 mmol/L    Anion Gap 11 3 - 16    Glucose 123 (H) 70 - 99 mg/dL    BUN 11 7 - 20 mg/dL    CREATININE 0.8 (L) 0.9 - 1.3 mg/dL    GFR Non-African American >60 >60    GFR African American >60 >60    Calcium 9.5 8.3 - 10.6 mg/dL   CBC auto differential   Result Value Ref Range    WBC 5.8 4.0 - 11.0 K/uL    RBC 5.49 4.20 - 5.90 M/uL    Hemoglobin 16.9 13.5 - 17.5 g/dL    Hematocrit 48.4 40.5 - 52.5 %    MCV 88.1 80.0 - 100.0 fL    MCH 30.8 26.0 - 34.0 pg    MCHC 35.0 31.0 - 36.0 g/dL    RDW 12.5 12.4 - 15.4 %    Platelets 578 015 - 380 K/uL    MPV 7.3 5.0 - 10.5 fL    Neutrophils % 60.3 %    Lymphocytes % 25.0 %    Monocytes % 6.8 %    Eosinophils % 7.4 %    Basophils % 0.5 %    Neutrophils Absolute 3.5 1.7 - 7.7 K/uL    Lymphocytes Absolute 1.4 1.0 - 5.1 K/uL    Monocytes Absolute 0.4 0.0 - 1.3 K/uL    Eosinophils Absolute 0.4 0.0 - 0.6 K/uL    Basophils Absolute 0.0 0.0 - 0.2 K/uL   POCT Glucose   Result Value Ref Range    POC Glucose 98 70 - 99 mg/dl    Performed on ACCU-CHEK    EKG 12 Lead   Result Value Ref Range    Ventricular Rate 98 BPM    Atrial Rate 98 BPM    P-R Interval 158 ms    QRS Duration 78 ms    Q-T Interval 316 ms    QTc Calculation (Bazett) 403 ms    P Axis 68 degrees    R Axis 6 degrees    T Axis 49 degrees    Diagnosis       EKG performed in ER and to be interpreted by ER physician. Confirmed by MD, ER ([id number]),  [address], [address] ([phone]) on 11/6/2021 10:45:19 AM       ED BEDSIDE ULTRASOUND:  No evidence of right heart strain, systolic dysfunction, pericardial effusion     RECENT VITALS:  BP: 129/70, Temp: 97.9 °F (36.6 °C), Pulse: 51,Resp: 18, SpO2: 97 %     Procedures      n/a    ED Course     Nursing Notes, Past Medical Hx, Past Surgical Hx, Social Hx, Allergies, and Family Hx were reviewed.     The patient was given the followingmedications:  Orders Placed This Encounter   Medications    lactated ringers bolus       CONSULTS:  None    MEDICAL DECISION MAKING / ASSESSMENT / PLAN     Jono Chun is a 31 y.o. male Patient presenting with syncopal episode at work today in the setting of alcohol use yesterday evening; he denies any preceding symptoms such as palpitations, chest pain or shortness of breath concerning for pulmonary embolism nor associated vital signs that are reflective of this, and no significant risk factors such as family history of hypercoagulability, recent trauma or surgery other factors. I suspect that his tachycardia on arrival was secondary to dehydration, not eating anything or drink anything this morning, and stress response. No evidence of anemia as potential etiology of his syncopal episode, had a prior MRI brain with and without as well as EEG that was negative for seizure-like activity. Without a preceding aura, postictal period, or neurologic deficits, lower suspicion for subarachnoid hemorrhage, seizure as other potential etiologies of his syncopal event. Patient divided fluids, reassessed, bedside echocardiogram without any evidence of cardiac dysfunction the setting of his pectus excavatum and prior minor mitral valvulopathy. No other symptomatology suggestive of descending dissection, marfanoid features. Recommended he follow-up with his primary doctor for Holter monitor, he has establish care with another primary physician outside of system, agreeable to this plan and discharged in stable condition after fluid resuscitation. This patient was also evaluated by the attending physician. All care plans werediscussed and agreed upon.     ED Course as of 11/06/21 1203   Sat Nov 06, 2021   1008 BP: 121/76 [LG]   1008 Pulse: 103 [LG]   1008 Resp: 17 [LG]   1008 SpO2: 100 % [LG]   1008 Temp: 97.9 °F (36.6 °C) [LG]   1137 BP: 129/70 [LG]   1202 Pulse: 51 [LG]   1202 Resp: 18 [LG]   1202 SpO2: 97 % [LG]      ED Course User Index  [LG] Minus MD Salomón         Clinical Impression     1.  Syncope and collapse        Disposition     PATIENT REFERRED TO:  Family Medicine  [facility] [id number] [phone]  In 2 weeks      Or your primary doctor you have already established with    In 2 weeks        DISCHARGE MEDICATIONS:  New Prescriptions    No medications on file       Chasidy Wadsworth MD    PGY-2, [facility]   Emergency Medicine       Minus MD Salomón  Resident  11/06/21 6468

## 2021-11-06 NOTE — ED TRIAGE NOTES
PT's coworkers called EMS and vitals were :  170/105 heart rate 124 ,o2 99%  145/92  Heart rate 107, o2 99%  They wanted to bring him, but pt told them he would come and came by private vehicle

## 2021-11-06 NOTE — ED TRIAGE NOTES
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 Pt denies hitting head states he fell to his back and rolled, denies pain at this time. Pt states he gets shaky before and after, last time this happened was in December 2020. Pt states in the middle of the night around 0200 woke up with a racing heart, but drank some water and went back to sleep. Pt did not eat breakfast this morning but yesterday he did have an allergic reaction to a nut and took benadryl.

## 2022-01-30 ENCOUNTER — HOSPITAL ENCOUNTER (EMERGENCY)
Age: 27
Discharge: HOME | End: 2022-01-30
Attending: EMERGENCY MEDICINE
Payer: COMMERCIAL

## 2022-01-30 VITALS
BODY MASS INDEX: 23.8 KG/M2 | DIASTOLIC BLOOD PRESSURE: 56 MMHG | WEIGHT: 170 LBS | RESPIRATION RATE: 19 BRPM | HEIGHT: 71 IN | SYSTOLIC BLOOD PRESSURE: 114 MMHG | HEART RATE: 103 BPM | TEMPERATURE: 98 F | OXYGEN SATURATION: 98 %

## 2022-01-30 DIAGNOSIS — R56.9 SEIZURE (HCC): Primary | ICD-10-CM

## 2022-01-30 LAB
ANION GAP SERPL CALCULATED.3IONS-SCNC: 21 MMOL/L (ref 3–16)
BASE EXCESS VENOUS: -6.5 MMOL/L (ref -2–3)
BUN BLDV-MCNC: 14 MG/DL (ref 7–20)
CALCIUM SERPL-MCNC: 9.7 MG/DL (ref 8.3–10.6)
CARBOXYHEMOGLOBIN: 0.7 % (ref 0–1.5)
CHLORIDE BLD-SCNC: 99 MMOL/L (ref 99–110)
CO2: 19 MMOL/L (ref 21–32)
CREAT SERPL-MCNC: 1 MG/DL (ref 0.9–1.3)
EKG ATRIAL RATE: 139 BPM
EKG DIAGNOSIS: NORMAL
EKG P AXIS: 66 DEGREES
EKG P-R INTERVAL: 148 MS
EKG Q-T INTERVAL: 358 MS
EKG QRS DURATION: 86 MS
EKG QTC CALCULATION (BAZETT): 544 MS
EKG R AXIS: 42 DEGREES
EKG T AXIS: 56 DEGREES
EKG VENTRICULAR RATE: 139 BPM
GFR AFRICAN AMERICAN: >60
GFR NON-AFRICAN AMERICAN: >60
GLUCOSE BLD-MCNC: 91 MG/DL (ref 70–99)
HCO3 VENOUS: 21.6 MMOL/L (ref 24–28)
HEMOGLOBIN, VEN, REDUCED: 49.2 %
LACTIC ACID: 10.4 MMOL/L (ref 0.4–2)
METHEMOGLOBIN VENOUS: 0.4 % (ref 0–1.5)
O2 SAT, VEN: 50 %
PCO2, VEN: 50.6 MMHG (ref 41–51)
PH VENOUS: 7.24 (ref 7.35–7.45)
PO2, VEN: 31.5 MMHG (ref 25–40)
POTASSIUM REFLEX MAGNESIUM: 5.3 MMOL/L (ref 3.5–5.1)
SODIUM BLD-SCNC: 139 MMOL/L (ref 136–145)
TCO2 CALC VENOUS: 23 MMOL/L
TROPONIN: <0.01 NG/ML

## 2022-01-30 PROCEDURE — 2580000003 HC RX 258: Performed by: EMERGENCY MEDICINE

## 2022-01-30 PROCEDURE — 6360000002 HC RX W HCPCS: Performed by: EMERGENCY MEDICINE

## 2022-01-30 PROCEDURE — 96374 THER/PROPH/DIAG INJ IV PUSH: CPT

## 2022-01-30 PROCEDURE — 82803 BLOOD GASES ANY COMBINATION: CPT

## 2022-01-30 PROCEDURE — 36415 COLL VENOUS BLD VENIPUNCTURE: CPT

## 2022-01-30 PROCEDURE — 80048 BASIC METABOLIC PNL TOTAL CA: CPT

## 2022-01-30 PROCEDURE — 93005 ELECTROCARDIOGRAM TRACING: CPT | Performed by: EMERGENCY MEDICINE

## 2022-01-30 PROCEDURE — 83605 ASSAY OF LACTIC ACID: CPT

## 2022-01-30 PROCEDURE — 84484 ASSAY OF TROPONIN QUANT: CPT

## 2022-01-30 PROCEDURE — 99284 EMERGENCY DEPT VISIT MOD MDM: CPT

## 2022-01-30 RX ORDER — 0.9 % SODIUM CHLORIDE 0.9 %
1000 INTRAVENOUS SOLUTION INTRAVENOUS ONCE
Status: COMPLETED | OUTPATIENT
Start: 2022-01-30 | End: 2022-01-30

## 2022-01-30 RX ORDER — LORAZEPAM 1 MG/1
1 TABLET ORAL 2 TIMES DAILY
Qty: 6 TABLET | Refills: 0 | Status: SHIPPED | OUTPATIENT
Start: 2022-01-30 | End: 2022-02-02

## 2022-01-30 RX ORDER — LORAZEPAM 2 MG/ML
1 INJECTION INTRAMUSCULAR ONCE
Status: COMPLETED | OUTPATIENT
Start: 2022-01-30 | End: 2022-01-30

## 2022-01-30 RX ADMIN — SODIUM CHLORIDE 1000 ML: 9 INJECTION, SOLUTION INTRAVENOUS at 08:04

## 2022-01-30 RX ADMIN — LORAZEPAM 1 MG: 2 INJECTION INTRAMUSCULAR; INTRAVENOUS at 08:06

## 2022-01-30 ASSESSMENT — ENCOUNTER SYMPTOMS
NAUSEA: 0
SORE THROAT: 0
ABDOMINAL PAIN: 0
COUGH: 0
CHEST TIGHTNESS: 0

## 2022-01-30 NOTE — ED PROVIDER NOTES
[address] [facility]          ATTENDING PHYSICIAN NOTE       Date of evaluation: 1/30/2022    Chief Complaint     Seizures      History of Present Illness     Antoni Martinez is a 31 y.o. male who presents to the emergency department via EMS after suspected seizure. He remembers feeling odd and dropping a glass, but not much else. He has minor discomfort where he bit his tongue; discomfort is localized, he does not feel that the tongue is swelling, no provoking or palliative features to the pain, and describes it as throbbing. He was in his usual state of health prior to this event. He states he has been participating in [place]" but drank a fair amount with friends last night. He thinks he may have gotten dehydrated. EMS found him to be awake and alert, but did witness a generalized tonic-clonic seizure. He was tachycardic to the 140s afterwards. Seizure activity lasted for a minute or less, postictal status improved during transport. He states he has had a couple of episodes in the past, all associated with an elevated heart rate afterwards, with some medical uncertainty of syncope versus seizure. He was hospitalized in 2020 and underwent testing. He had an ED visit last year after an episode. Those were also in the context of alcohol excess. Review of Systems     Review of Systems   Constitutional: Negative for fever. HENT: Negative for sore throat. Respiratory: Negative for cough and chest tightness. Cardiovascular: Negative for chest pain and leg swelling. Gastrointestinal: Negative for abdominal pain and nausea. Musculoskeletal: Negative for neck stiffness. Neurological: Negative for dizziness and headaches. All other systems reviewed and are negative. Past Medical, Surgical, Family, and Social History     He has a past medical history of Anxiety, Depression, Seizures ([address] and Syncope.   He has no past surgical history on file.  His family history is not on file. He reports that he has never smoked. He has never used smokeless tobacco. He reports current alcohol use. He reports current drug use. Drug: Marijuana Westly Marcelino). Medications     Previous Medications    BUSPIRONE (BUSPAR) 10 MG TABLET    Take 10 mg by mouth 2 times daily    LORATADINE (CLARITIN) 10 MG TABLET    Take 10 mg by mouth daily    SERTRALINE (ZOLOFT) 50 MG TABLET    Take 50 mg by mouth daily       Allergies     He is allergic to nuts [peanut-containing drug products]. Physical Exam     INITIAL VITALS: BP (!) 143/90   Pulse 138   Temp 98 °F (36.7 °C) (Oral)   Resp 18   Ht 5' 11\" (1.803 m)   Wt 170 lb (77.1 kg)   SpO2 98%   BMI 23.71 kg/m²    General: Well developed, well nourished male in no acute distress. HEENT: Sclera white, conjunctiva pink, mucous membranes moist and oropharynx clear, no tongue laceration  Neck: Supple, no meningismus or JVD  Respirations: Unlabored with symmetric chest rise and fall  CV: Tachycardic regular rhythm with strong distal pulses  Abd: Soft without rebound guarding distention or focal tenderness  Musculoskeletal: Moves all extremities with no signs of orthopedic trauma or edema. Skin: Warm and dry with no rashes or lesions. Neuro: Awake and alert with no focal neurologic deficits. Normal speech and gait. Psych: Mood and affect are normal.    Diagnostic Results     EKG   Indication: Tachycardia. Interpreted by me. Sinus tachycardia normal axis, normal intervals with exception of a QTC of 544 ms. Rate 139. No ST segment or T wave changes.   Intervention: Sinus tachycardia no signs of acute ischemia or infarction      LABS:   Results for orders placed or performed during the hospital encounter of 77/19/25   Basic Metabolic Panel w/ Reflex to MG   Result Value Ref Range    Sodium 139 136 - 145 mmol/L    Potassium reflex Magnesium 5.3 (H) 3.5 - 5.1 mmol/L    Chloride 99 99 - 110 mmol/L    CO2 19 (L) 21 - 32 mmol/L Anion Gap 21 (H) 3 - 16    Glucose 91 70 - 99 mg/dL    BUN 14 7 - 20 mg/dL    CREATININE 1.0 0.9 - 1.3 mg/dL    GFR Non-African American >60 >60    GFR African American >60 >60    Calcium 9.7 8.3 - 10.6 mg/dL   Troponin   Result Value Ref Range    Troponin <0.01 <0.01 ng/mL   Blood Gas, Venous   Result Value Ref Range    pH, Suhas 7.238 (L) 7.350 - 7.450    pCO2, Suhas 50.6 41.0 - 51.0 mmHg    pO2, Suhas 31.5 25.0 - 40.0 mmHg    HCO3, Venous 21.6 (L) 24.0 - 28.0 mmol/L    Base Excess, Suhas -6.5 (L) -2.0 - 3.0 mmol/L    O2 Sat, Suhas 50 Not established %    Carboxyhemoglobin 0.7 0.0 - 1.5 %    MetHgb, Suhas 0.4 0.0 - 1.5 %    TC02 (Calc), Suhas 23 mmol/L    Hemoglobin, Suhas, Reduced 49.20 %   Lactic Acid, Plasma   Result Value Ref Range    Lactic Acid 10.4 (HH) 0.4 - 2.0 mmol/L   EKG 12 Lead   Result Value Ref Range    Ventricular Rate 139 BPM    Atrial Rate 139 BPM    P-R Interval 148 ms    QRS Duration 86 ms    Q-T Interval 358 ms    QTc Calculation (Bazett) 544 ms    P Axis 66 degrees    R Axis 42 degrees    T Axis 56 degrees    Diagnosis       EKG performed in ER and to be interpreted by ER physician. Confirmed by MD, ER ([id number]),  [address] ([phone]) on 1/30/2022 7:50:18 AM     RECENT VITALS:  BP: (!) 114/56, Temp: 98 °F (36.7 °C), Pulse: 103, Resp: 19, SpO2: 98 %       ED Course     Nursing Notes, Past Medical Hx, Past Surgical Hx, Social Hx, Allergies, and Family Hx were reviewed. The patient was given the following medications:  Orders Placed This Encounter   Medications    0.9 % sodium chloride bolus    LORazepam (ATIVAN) injection 1 mg    LORazepam (ATIVAN) 1 MG tablet     Sig: Take 1 tablet by mouth 2 times daily for 3 days. Dispense:  6 tablet     Refill:  0     ED Course as of 01/30/22 1054   Sun Jan 30, 2022   0900 Reassessed. No complaints.   Heart rate 97 normal sinus on the monitor [JM]   0907 EEG from 12/2020 (copied from discharge summary): EEG showed generalized slowing of theta wave activity but not suggestive of seizure. [DEVIN]   5915 MRI from 12/2020: 1. No acute intracranial abnormality or mass. 2. Mild paranasal sinus mucosal disease. [DEVIN]      ED Course User Index  [DEVIN] Silviano Smoers MD         He was seen and examined on arrival to the treatment area. Nursing notes and computerized medical records were reviewed. IV established and labs obtained. Placed on telemetry and continuous pulse oximetry. Monitored in the emergency department with no further seizure-like activity. I discussed results, aftercare, and return precautions with the patient and his father, and both agree and understand. Tachycardia resolved while in the emergency department. MEDICAL DECISION MAKING / ASSESSMENT / PLAN     Cheo Jung is a 31 y.o. male presents to the emergency department by EMS after a witnessed seizure. He was found to be tachycardia without dysrhythmia, and this resolved with time, fluids, and a dose of Ativan. He does have an elevated lactate, consistent with a seizure activity. No evidence of status epilepticus. He has had an MRI and an EEG within the last couple of years. He has no focal neurologic deficits today. These episodes appear similar to previous. I do not feel he requires repeat advanced diagnostic studies in the emergency department. I feel he is safe for discharge with outpatient follow-up. He is referred back to [facility] neurology, who saw him as an inpatient. He is cautioned on driving, swimming, ladders, and other potentially dangerous activities. He does not have a 's license currently. I will prescribe a short course of lorazepam in an effort to prevent further short-term seizures. He was cautioned on the interaction between benzodiazepines and alcohol. I do not feel that this is related to alcohol withdrawal, as he has no other alcohol withdrawal symptoms. No history of trauma.   Based on history and physical, no other obvious precipitants of the seizure today. Clinical Impression     1. Seizure [facility])        Disposition     PATIENT REFERRED TO:  Melly Barajas MD  [address] [place] [address]  [phone]      For re-evaluation, follow-up, and discuss ED visit    [facility] Emergency Department  [address] [place] [address]  [phone]    As needed, If symptoms worsen      DISCHARGE MEDICATIONS:  New Prescriptions    LORAZEPAM (ATIVAN) 1 MG TABLET    Take 1 tablet by mouth 2 times daily for 3 days.        DISPOSITION Decision To Discharge 01/30/2022 10:00:01 AM         Jacobo Siddiqui MD  01/30/22 3452

## 2022-01-30 NOTE — ED NOTES
Pt discharged to home. IV removed, tip intact and pressure applied. Pt given discharge instructions and verbalized understanding. Pt ambulatory at discharge and left without incident.       Gina Anton RN  01/30/22 2092

## 2023-01-29 ENCOUNTER — APPOINTMENT (OUTPATIENT)
Dept: CT IMAGING | Age: 28
End: 2023-01-29
Payer: COMMERCIAL

## 2023-01-29 ENCOUNTER — HOSPITAL ENCOUNTER (EMERGENCY)
Age: 28
Discharge: HOME | End: 2023-01-29
Attending: EMERGENCY MEDICINE
Payer: COMMERCIAL

## 2023-01-29 VITALS
TEMPERATURE: 98 F | WEIGHT: 173 LBS | BODY MASS INDEX: 24.13 KG/M2 | HEART RATE: 107 BPM | SYSTOLIC BLOOD PRESSURE: 131 MMHG | RESPIRATION RATE: 16 BRPM | OXYGEN SATURATION: 100 % | DIASTOLIC BLOOD PRESSURE: 85 MMHG

## 2023-01-29 DIAGNOSIS — R56.9 SEIZURE (HCC): Primary | ICD-10-CM

## 2023-01-29 LAB
A/G RATIO: 1.8 (ref 1.1–2.2)
ALBUMIN SERPL-MCNC: 5.3 G/DL (ref 3.4–5)
ALP BLD-CCNC: 88 U/L (ref 40–129)
ALT SERPL-CCNC: 25 U/L (ref 10–40)
ANION GAP SERPL CALCULATED.3IONS-SCNC: 13 MMOL/L (ref 3–16)
ANION GAP SERPL CALCULATED.3IONS-SCNC: 23 MMOL/L (ref 3–16)
AST SERPL-CCNC: 30 U/L (ref 15–37)
BASOPHILS ABSOLUTE: 0.1 K/UL (ref 0–0.2)
BASOPHILS RELATIVE PERCENT: 0.9 %
BILIRUB SERPL-MCNC: 0.3 MG/DL (ref 0–1)
BUN BLDV-MCNC: 12 MG/DL (ref 7–20)
BUN BLDV-MCNC: 13 MG/DL (ref 7–20)
CALCIUM SERPL-MCNC: 9.4 MG/DL (ref 8.3–10.6)
CALCIUM SERPL-MCNC: 9.7 MG/DL (ref 8.3–10.6)
CHLORIDE BLD-SCNC: 96 MMOL/L (ref 99–110)
CHLORIDE BLD-SCNC: 98 MMOL/L (ref 99–110)
CO2: 19 MMOL/L (ref 21–32)
CO2: 25 MMOL/L (ref 21–32)
CREAT SERPL-MCNC: 1 MG/DL (ref 0.9–1.3)
CREAT SERPL-MCNC: 1.1 MG/DL (ref 0.9–1.3)
EOSINOPHILS ABSOLUTE: 0.3 K/UL (ref 0–0.6)
EOSINOPHILS RELATIVE PERCENT: 3 %
GFR SERPL CREATININE-BSD FRML MDRD: >60 ML/MIN/{1.73_M2}
GFR SERPL CREATININE-BSD FRML MDRD: >60 ML/MIN/{1.73_M2}
GLUCOSE BLD-MCNC: 100 MG/DL (ref 70–99)
GLUCOSE BLD-MCNC: 93 MG/DL (ref 70–99)
HCT VFR BLD CALC: 52.6 % (ref 40.5–52.5)
HEMOGLOBIN: 17.5 G/DL (ref 13.5–17.5)
LYMPHOCYTES ABSOLUTE: 2.9 K/UL (ref 1–5.1)
LYMPHOCYTES RELATIVE PERCENT: 27.3 %
MCH RBC QN AUTO: 29.6 PG (ref 26–34)
MCHC RBC AUTO-ENTMCNC: 33.3 G/DL (ref 31–36)
MCV RBC AUTO: 89 FL (ref 80–100)
MONOCYTES ABSOLUTE: 0.5 K/UL (ref 0–1.3)
MONOCYTES RELATIVE PERCENT: 4.2 %
NEUTROPHILS ABSOLUTE: 7 K/UL (ref 1.7–7.7)
NEUTROPHILS RELATIVE PERCENT: 64.6 %
PDW BLD-RTO: 13.2 % (ref 12.4–15.4)
PLATELET # BLD: 319 K/UL (ref 135–450)
PMV BLD AUTO: 7.7 FL (ref 5–10.5)
POTASSIUM REFLEX MAGNESIUM: 4 MMOL/L (ref 3.5–5.1)
POTASSIUM REFLEX MAGNESIUM: 4.1 MMOL/L (ref 3.5–5.1)
RBC # BLD: 5.91 M/UL (ref 4.2–5.9)
SODIUM BLD-SCNC: 136 MMOL/L (ref 136–145)
SODIUM BLD-SCNC: 138 MMOL/L (ref 136–145)
TOTAL PROTEIN: 8.3 G/DL (ref 6.4–8.2)
WBC # BLD: 10.8 K/UL (ref 4–11)

## 2023-01-29 PROCEDURE — 93005 ELECTROCARDIOGRAM TRACING: CPT

## 2023-01-29 PROCEDURE — 80053 COMPREHEN METABOLIC PANEL: CPT

## 2023-01-29 PROCEDURE — 36415 COLL VENOUS BLD VENIPUNCTURE: CPT

## 2023-01-29 PROCEDURE — 99284 EMERGENCY DEPT VISIT MOD MDM: CPT

## 2023-01-29 PROCEDURE — 70450 CT HEAD/BRAIN W/O DYE: CPT

## 2023-01-29 PROCEDURE — 85025 COMPLETE CBC W/AUTO DIFF WBC: CPT

## 2023-01-29 PROCEDURE — 6370000000 HC RX 637 (ALT 250 FOR IP)

## 2023-01-29 PROCEDURE — 2580000003 HC RX 258

## 2023-01-29 RX ORDER — SODIUM CHLORIDE, SODIUM LACTATE, POTASSIUM CHLORIDE, AND CALCIUM CHLORIDE .6; .31; .03; .02 G/100ML; G/100ML; G/100ML; G/100ML
1000 INJECTION, SOLUTION INTRAVENOUS ONCE
Status: COMPLETED | OUTPATIENT
Start: 2023-01-29 | End: 2023-01-29

## 2023-01-29 RX ORDER — METHOCARBAMOL 500 MG/1
750 TABLET, FILM COATED ORAL ONCE
Status: COMPLETED | OUTPATIENT
Start: 2023-01-29 | End: 2023-01-29

## 2023-01-29 RX ORDER — ACETAMINOPHEN 500 MG
1000 TABLET ORAL
Status: COMPLETED | OUTPATIENT
Start: 2023-01-29 | End: 2023-01-29

## 2023-01-29 RX ADMIN — ACETAMINOPHEN 1000 MG: 500 TABLET ORAL at 15:38

## 2023-01-29 RX ADMIN — SODIUM CHLORIDE, POTASSIUM CHLORIDE, SODIUM LACTATE AND CALCIUM CHLORIDE 1000 ML: 600; 310; 30; 20 INJECTION, SOLUTION INTRAVENOUS at 15:44

## 2023-01-29 RX ADMIN — METHOCARBAMOL 750 MG: 500 TABLET ORAL at 15:38

## 2023-01-29 ASSESSMENT — PAIN DESCRIPTION - ORIENTATION: ORIENTATION: LOWER

## 2023-01-29 ASSESSMENT — PAIN DESCRIPTION - FREQUENCY: FREQUENCY: CONTINUOUS

## 2023-01-29 ASSESSMENT — PAIN SCALES - GENERAL
PAINLEVEL_OUTOF10: 9
PAINLEVEL_OUTOF10: 8

## 2023-01-29 ASSESSMENT — PAIN - FUNCTIONAL ASSESSMENT
PAIN_FUNCTIONAL_ASSESSMENT: 0-10
PAIN_FUNCTIONAL_ASSESSMENT: 0-10

## 2023-01-29 ASSESSMENT — PAIN DESCRIPTION - LOCATION
LOCATION: BACK
LOCATION: BACK

## 2023-01-29 ASSESSMENT — PAIN DESCRIPTION - PAIN TYPE: TYPE: ACUTE PAIN

## 2023-01-29 NOTE — DISCHARGE INSTRUCTIONS
You were seen in the emergency department after a seizure, likely related to alcohol consumption. Fortunately, your head CT here is normal, and your labs do not show any concerning signs of infection or electrolyte abnormality. We think it is safe for you to return home at this time. We recommend that you refrain from all alcohol until you have had the chance to follow up with Neurology. Also be aware of activities that may place you at high risk should you have a seizure, such as driving, swimming, or taking a bath. Please return to the emergency department for any new or worsening symptoms, including repeat seizure, confusion, or chest pain.

## 2023-01-29 NOTE — ED PROVIDER NOTES
[address] [facility] RESIDENT NOTE       Date of evaluation: 1/29/2023    Chief Complaint     Seizures    History of Present Illness     Shahida Leslie is a 28 y.o. male with a history of seizure approximately one year ago, as well as previous episodes of syncope vs. seizure, who presents for evaluation after a seizure. Per chart review, the patient's previous seizure occurred after a night of heavy alcohol consumption; his subsequent workup was unremarkable. The patient reports that last night he drank a large amount of alcohol with friends, then slept on a friend's couch. His friend then witnessed a tonic-clonic seizure this morning, and called 911. The patient did have a post-ictal period, but on ED arrival is alert and oriented. He endorses left tongue soreness and diffuse back pain, \"like I threw my back out. \" He denies focal numbness/tingling/weakness, vision changes, hearing changes, headache, fevers, chills, nausea, vomiting, shortness of breath, chest pain, abdominal pain, and urinary symptoms. MEDICAL DECISION MAKING / ASSESSMENT / PLAN     INITIAL VITALS: BP: 131/85, Temp: 98 °F (36.7 °C), Heart Rate: (!) 107, Resp: 16, SpO2: 100 %    Shahida Leslie is a 28 y.o. male with a history of seizure presenting after repeat seizure. The patient was tachycardic on arrival, which is consistent with his most recent evaluation after seizure. He also consumed a large amount of alcohol last night, which is also consistent. He had a completely nonfocal neurologic exam here, and was back to baseline, per father. Nevertheless, he has not had brain imaging in some time, so I opted to obtain a head CT. This was unremarkable. His initial labs were notable for an anion gap of 25, likely secondary to alcohol consumption and a probably lactic acidosis from his seizure. After a bolus of LR, his gap had resolved.     The patient remained stable throughout his stay, and, with normal labs, imaging, and neuro exam, I think he is safe for discharge home. I advised him on the importance of follow-up with Neurology, as well as the dangers of driving, swimming, and bathing. He understood, and in fact does not drive at all. All questions were answered, and strict return precautions were given. Medical Decision Making  Amount and/or Complexity of Data Reviewed  Labs: ordered. Radiology: ordered. ECG/medicine tests: ordered. Risk  OTC drugs. Prescription drug management. This patient was also evaluated by the attending physician. All care plans werediscussed and agreed upon. Clinical Impression     1. Seizure [facility])        Disposition     PATIENT REFERRED TO:  Laisha John MD  [address] [address]  [phone]    Schedule an appointment as soon as possible for a visit       DISCHARGE MEDICATIONS:  New Prescriptions    No medications on file       DISPOSITION Decision To Discharge 01/29/2023 04:14:19 PM    Diagnostic Results and Other Data     RADIOLOGY:  CT Head W/O Contrast   Final Result      1. No acute intracranial abnormality.               LABS:   Results for orders placed or performed during the hospital encounter of 01/29/23   CBC with Auto Differential   Result Value Ref Range    WBC 10.8 4.0 - 11.0 K/uL    RBC 5.91 (H) 4.20 - 5.90 M/uL    Hemoglobin 17.5 13.5 - 17.5 g/dL    Hematocrit 52.6 (H) 40.5 - 52.5 %    MCV 89.0 80.0 - 100.0 fL    MCH 29.6 26.0 - 34.0 pg    MCHC 33.3 31.0 - 36.0 g/dL    RDW 13.2 12.4 - 15.4 %    Platelets 550 417 - 722 K/uL    MPV 7.7 5.0 - 10.5 fL    Neutrophils % 64.6 %    Lymphocytes % 27.3 %    Monocytes % 4.2 %    Eosinophils % 3.0 %    Basophils % 0.9 %    Neutrophils Absolute 7.0 1.7 - 7.7 K/uL    Lymphocytes Absolute 2.9 1.0 - 5.1 K/uL    Monocytes Absolute 0.5 0.0 - 1.3 K/uL    Eosinophils Absolute 0.3 0.0 - 0.6 K/uL    Basophils Absolute 0.1 0.0 - 0.2 K/uL   CMP w/ Reflex to MG   Result Value Ref Range    Sodium 138 136 - 145 mmol/L    Potassium reflex Magnesium 4.0 3.5 - 5.1 mmol/L    Chloride 96 (L) 99 - 110 mmol/L    CO2 19 (L) 21 - 32 mmol/L    Anion Gap 23 (H) 3 - 16    Glucose 100 (H) 70 - 99 mg/dL    BUN 12 7 - 20 mg/dL    Creatinine 1.1 0.9 - 1.3 mg/dL    Est, Glom Filt Rate >60 >60    Calcium 9.7 8.3 - 10.6 mg/dL    Total Protein 8.3 (H) 6.4 - 8.2 g/dL    Albumin 5.3 (H) 3.4 - 5.0 g/dL    Albumin/Globulin Ratio 1.8 1.1 - 2.2    Total Bilirubin 0.3 0.0 - 1.0 mg/dL    Alkaline Phosphatase 88 40 - 129 U/L    ALT 25 10 - 40 U/L    AST 30 15 - 37 U/L   BMP w/ Reflex to MG   Result Value Ref Range    Sodium 136 136 - 145 mmol/L    Potassium reflex Magnesium 4.1 3.5 - 5.1 mmol/L    Chloride 98 (L) 99 - 110 mmol/L    CO2 25 21 - 32 mmol/L    Anion Gap 13 3 - 16    Glucose 93 70 - 99 mg/dL    BUN 13 7 - 20 mg/dL    Creatinine 1.0 0.9 - 1.3 mg/dL    Est, Glom Filt Rate >60 >60    Calcium 9.4 8.3 - 10.6 mg/dL     EKG   Interpreted in conjunction with emergencydepartment physician No att. providers found  Rhythm: normal sinus   Rate: tachycardia, 111  Axis: normal  ST Segments: normal  T Waves:inversion in  v1 and aVr  Clinical Impression: non-specific EKG  Comparison: no significant change from previous    RECENT VITALS:  BP: 131/85, Temp: 98 °F (36.7 °C), Heart Rate: (!) 107,Resp: 16, SpO2: 100 %     ED Course     Nursing Notes, Past Medical Hx, Past Surgical Hx, Social Hx, Allergies, and Family Hx were reviewed. The patient was given the following medications:  Orders Placed This Encounter   Medications    acetaminophen (TYLENOL) tablet 1,000 mg    methocarbamol (ROBAXIN) tablet 750 mg    lactated ringers bolus       CONSULTS:  None    Review of Systems     A 10-point Review of Systems was completed, and, unless otherwise noted in HPI, was negative. Past Medical, Surgical, Family, and Social History     He has a past medical history of Anxiety, Depression, Seizures ([facility] [address] and Syncope.   He has no past surgical history on file. His family history is not on file. He reports that he has never smoked. He has never used smokeless tobacco. He reports current alcohol use. He reports current drug use. Drug: Marijuana Laure Melvin). Medications     Previous Medications    BUSPIRONE (BUSPAR) 10 MG TABLET    Take 10 mg by mouth 2 times daily    LORATADINE (CLARITIN) 10 MG TABLET    Take 10 mg by mouth daily    SERTRALINE (ZOLOFT) 50 MG TABLET    Take 50 mg by mouth daily       Allergies     He is allergic to nuts [peanut-containing drug products]. Physical Exam     INITIAL VITALS: BP: 131/85, Temp: 98 °F (36.7 °C), Heart Rate: (!) 107, Resp: 16, SpO2: 100 %   General: Appears stated age, in no acute distress  Eyes: Pupils reactive. No eye discharge. HENT: Normocephalic and atraumatic. External ears normal. External nose normal. OP clear. Neck: Supple. Cardiac: Regular rate and rhythm. No murmurs, rubs, or gallops. Pulmonary: Non-labored respiration. Clear to auscultation bilaterally. Abdomen: Soft, nondistended. No tenderness to palpation. Musculoskeletal: No long bone deformity. Vascular: Normal pulses in all extremities. Skin: Warm, dry. No rashes. Extremities: No peripheral edema. Neuro: Alert. Moves all four extremities to command. No focal deficits.        Jeet Wheeler MD  01/29/23 4964

## 2023-01-29 NOTE — ED PROVIDER NOTES
ED Attending Attestation Note     Date of evaluation: 1/29/2023    This patient was seen by the resident. I have seen and examined the patient, agree with the workup, evaluation, management and diagnosis. The care plan has been discussed. I have reviewed the ECG and concur with the resident's interpretation. Brie Zavala is a 29-year-old male with a past medical history of 1 seizure who present to the emergency department today for seizures. His previous seizure was approximately a year ago. It happened after drinking alcohol. Last night, he drank 2 beers and 2 cocktails. He woke up this morning and was noted to have what sounds like tonic-clonic seizures. This sounds consistent with the previous incident. He had seen neurology at that time with no seizure diagnosis. He is not on antiepileptics. He did bite the right side of his tongue. No bladder incontinence. He denies any other complaints. He is currently at his baseline. On exam, lungs are clear and equal.  Abdomen is soft and nontender. Radial pulses 2+. Cranial nerves II through XII are intact. Finger-to-nose, rapid alternating movements, heel-to-shin are intact. Muscle strength is 5/5 in bilateral upper and lower extremities. Sensation is intact in bilateral upper and lower extremities.        Rodrigo Brown MD  01/29/23 [id number]

## 2023-01-30 LAB
EKG ATRIAL RATE: 111 BPM
EKG DIAGNOSIS: NORMAL
EKG P AXIS: 64 DEGREES
EKG P-R INTERVAL: 170 MS
EKG Q-T INTERVAL: 320 MS
EKG QRS DURATION: 72 MS
EKG QTC CALCULATION (BAZETT): 435 MS
EKG R AXIS: 24 DEGREES
EKG T AXIS: 50 DEGREES
EKG VENTRICULAR RATE: 111 BPM